# Patient Record
Sex: MALE | Employment: OTHER
[De-identification: names, ages, dates, MRNs, and addresses within clinical notes are randomized per-mention and may not be internally consistent; named-entity substitution may affect disease eponyms.]

---

## 2017-11-14 ENCOUNTER — HOSPITAL (OUTPATIENT)
Dept: OTHER | Age: 77
End: 2017-11-14
Attending: PHYSICAL MEDICINE & REHABILITATION

## 2017-11-15 ENCOUNTER — CHARTING TRANS (OUTPATIENT)
Dept: OTHER | Age: 77
End: 2017-11-15

## 2017-12-12 ENCOUNTER — HOSPITAL (OUTPATIENT)
Dept: OTHER | Age: 77
End: 2017-12-12
Attending: OPHTHALMOLOGY

## 2017-12-12 LAB — GLUCOSE BLDC GLUCOMTR-MCNC: 107 MG/DL (ref 65–99)

## 2018-01-04 ENCOUNTER — HOSPITAL (OUTPATIENT)
Dept: OTHER | Age: 78
End: 2018-01-04
Attending: OPHTHALMOLOGY

## 2018-01-04 LAB — GLUCOSE BLDC GLUCOMTR-MCNC: 101 MG/DL (ref 65–99)

## 2018-05-31 ENCOUNTER — CHARTING TRANS (OUTPATIENT)
Dept: OTHER | Age: 78
End: 2018-05-31

## 2018-05-31 ENCOUNTER — HOSPITAL (OUTPATIENT)
Dept: OTHER | Age: 78
End: 2018-05-31
Attending: PHYSICAL MEDICINE & REHABILITATION

## 2019-01-24 ENCOUNTER — HOSPITAL (OUTPATIENT)
Dept: OTHER | Age: 79
End: 2019-01-24

## 2019-01-24 ENCOUNTER — HOSPITAL (OUTPATIENT)
Dept: OTHER | Age: 79
End: 2019-01-24
Attending: PHYSICAL MEDICINE & REHABILITATION

## 2019-04-18 ENCOUNTER — HOSPITAL (OUTPATIENT)
Dept: OTHER | Age: 79
End: 2019-04-18
Attending: PHYSICAL MEDICINE & REHABILITATION

## 2019-05-01 ENCOUNTER — HOSPITAL (OUTPATIENT)
Dept: OTHER | Age: 79
End: 2019-05-01
Attending: PHYSICAL MEDICINE & REHABILITATION

## 2019-05-02 ENCOUNTER — HOSPITAL (OUTPATIENT)
Dept: OTHER | Age: 79
End: 2019-05-02
Attending: PHYSICAL MEDICINE & REHABILITATION

## 2019-06-01 ENCOUNTER — HOSPITAL (OUTPATIENT)
Dept: OTHER | Age: 79
End: 2019-06-01
Attending: PHYSICAL MEDICINE & REHABILITATION

## 2020-01-23 ENCOUNTER — HOSPITAL ENCOUNTER (OUTPATIENT)
Dept: PAIN MANAGEMENT | Age: 80
Discharge: HOME | End: 2020-01-23
Attending: PHYSICAL MEDICINE & REHABILITATION

## 2020-01-23 ENCOUNTER — HOSPITAL ENCOUNTER (OUTPATIENT)
Dept: GENERAL RADIOLOGY | Age: 80
Discharge: HOME | End: 2020-01-23
Attending: PHYSICAL MEDICINE & REHABILITATION

## 2020-01-23 ENCOUNTER — PREP FOR CASE (OUTPATIENT)
Dept: PHYSICAL MEDICINE AND REHAB | Age: 80
End: 2020-01-23

## 2020-01-23 VITALS
WEIGHT: 195 LBS | DIASTOLIC BLOOD PRESSURE: 82 MMHG | OXYGEN SATURATION: 98 % | BODY MASS INDEX: 27.92 KG/M2 | HEART RATE: 98 BPM | RESPIRATION RATE: 17 BRPM | SYSTOLIC BLOOD PRESSURE: 164 MMHG | HEIGHT: 70 IN

## 2020-01-23 DIAGNOSIS — R52 PAIN: ICD-10-CM

## 2020-01-23 DIAGNOSIS — M48.04 STENOSIS, SPINAL, THORACIC: Primary | ICD-10-CM

## 2020-01-23 PROCEDURE — 13000067 HB PAIN MANAGEMENT GROUP 2

## 2020-01-23 PROCEDURE — 10002800 HB RX 250 W HCPCS: Performed by: PHYSICAL MEDICINE & REHABILITATION

## 2020-01-23 PROCEDURE — 76000 FLUOROSCOPY <1 HR PHYS/QHP: CPT

## 2020-01-23 PROCEDURE — 10002801 HB RX 250 W/O HCPCS: Performed by: PHYSICAL MEDICINE & REHABILITATION

## 2020-01-23 PROCEDURE — 10004651 HB RX, NO CHARGE ITEM: Performed by: PHYSICAL MEDICINE & REHABILITATION

## 2020-01-23 PROCEDURE — 10002805 HB CONTRAST AGENT: Performed by: PHYSICAL MEDICINE & REHABILITATION

## 2020-01-23 RX ORDER — LIDOCAINE HYDROCHLORIDE 10 MG/ML
30 INJECTION, SOLUTION INFILTRATION; PERINEURAL ONCE
Status: CANCELLED | OUTPATIENT
Start: 2020-01-23 | End: 2020-01-23

## 2020-01-23 RX ORDER — HYDROCHLOROTHIAZIDE 25 MG/1
25 TABLET ORAL DAILY
COMMUNITY

## 2020-01-23 RX ORDER — LOSARTAN POTASSIUM 50 MG/1
50 TABLET ORAL DAILY
COMMUNITY

## 2020-01-23 RX ORDER — ASCORBIC ACID 500 MG
500 TABLET ORAL DAILY
COMMUNITY

## 2020-01-23 RX ORDER — LIDOCAINE HYDROCHLORIDE 10 MG/ML
30 INJECTION, SOLUTION INFILTRATION; PERINEURAL ONCE
Status: COMPLETED | OUTPATIENT
Start: 2020-01-23 | End: 2020-01-23

## 2020-01-23 RX ORDER — DEXAMETHASONE SODIUM PHOSPHATE 4 MG/ML
15 INJECTION, SOLUTION INTRA-ARTICULAR; INTRALESIONAL; INTRAMUSCULAR; INTRAVENOUS; SOFT TISSUE ONCE
Status: DISCONTINUED | OUTPATIENT
Start: 2020-01-23 | End: 2020-01-23

## 2020-01-23 RX ORDER — DEXAMETHASONE SODIUM PHOSPHATE 10 MG/ML
15 INJECTION, SOLUTION INTRAMUSCULAR; INTRAVENOUS ONCE
Status: COMPLETED | OUTPATIENT
Start: 2020-01-23 | End: 2020-01-23

## 2020-01-23 RX ORDER — DEXAMETHASONE SODIUM PHOSPHATE 10 MG/ML
15 INJECTION INTRAMUSCULAR; INTRAVENOUS ONCE
Status: CANCELLED | OUTPATIENT
Start: 2020-01-23 | End: 2020-01-23

## 2020-01-23 RX ADMIN — DEXAMETHASONE SODIUM PHOSPHATE 15 MG: 10 INJECTION, SOLUTION INTRAMUSCULAR; INTRAVENOUS at 12:35

## 2020-01-23 RX ADMIN — SODIUM CHLORIDE 10 ML: 9 INJECTION, SOLUTION INTRAMUSCULAR; INTRAVENOUS; SUBCUTANEOUS at 12:36

## 2020-01-23 RX ADMIN — LIDOCAINE HYDROCHLORIDE 300 MG: 10 INJECTION, SOLUTION EPIDURAL; INFILTRATION; INTRACAUDAL; PERINEURAL at 12:32

## 2020-01-23 RX ADMIN — IOHEXOL 10 ML: 300 INJECTION, SOLUTION INTRAVENOUS at 12:33

## 2020-01-23 ASSESSMENT — PAIN SCALES - GENERAL: PAINLEVEL_OUTOF10: 1

## 2020-04-17 ENCOUNTER — TELEPHONE (OUTPATIENT)
Dept: ORTHOPEDIC SURGERY | Age: 80
End: 2020-04-17

## 2020-04-23 ENCOUNTER — TELEPHONE (OUTPATIENT)
Dept: ORTHOPEDIC SURGERY | Age: 80
End: 2020-04-23

## 2020-04-23 ENCOUNTER — VIRTUAL VISIT (OUTPATIENT)
Dept: ORTHOPEDIC SURGERY | Age: 80
End: 2020-04-23
Payer: MEDICARE

## 2020-04-23 VITALS — BODY MASS INDEX: 27.2 KG/M2 | HEIGHT: 70 IN | WEIGHT: 190 LBS | RESPIRATION RATE: 17 BRPM

## 2020-04-23 PROBLEM — M72.0 DUPUYTREN'S CONTRACTURE OF BOTH HANDS: Status: ACTIVE | Noted: 2020-04-23

## 2020-04-23 PROCEDURE — 4040F PNEUMOC VAC/ADMIN/RCVD: CPT | Performed by: ORTHOPAEDIC SURGERY

## 2020-04-23 PROCEDURE — 1123F ACP DISCUSS/DSCN MKR DOCD: CPT | Performed by: ORTHOPAEDIC SURGERY

## 2020-04-23 PROCEDURE — 99203 OFFICE O/P NEW LOW 30 MIN: CPT | Performed by: ORTHOPAEDIC SURGERY

## 2020-04-23 PROCEDURE — G8427 DOCREV CUR MEDS BY ELIG CLIN: HCPCS | Performed by: ORTHOPAEDIC SURGERY

## 2020-04-23 RX ORDER — HYDROCHLOROTHIAZIDE 25 MG/1
TABLET ORAL
COMMUNITY
Start: 2020-03-04

## 2020-04-23 RX ORDER — ZINC ACETATE 50 MG/1
1 CAPSULE ORAL
COMMUNITY
Start: 2020-03-04

## 2020-04-23 RX ORDER — LOSARTAN POTASSIUM 50 MG/1
TABLET ORAL
COMMUNITY
Start: 2020-02-27

## 2020-04-23 RX ORDER — ASPIRIN 81 MG/1
TABLET, CHEWABLE ORAL
COMMUNITY
Start: 2020-03-04

## 2020-04-23 RX ORDER — SIMVASTATIN 20 MG
TABLET ORAL
COMMUNITY
Start: 2020-02-24

## 2020-06-29 ENCOUNTER — OFFICE VISIT (OUTPATIENT)
Dept: ORTHOPEDIC SURGERY | Age: 80
End: 2020-06-29
Payer: MEDICARE

## 2020-06-29 PROCEDURE — 20527 NJX NZM PALMAR FASCIAL CORD: CPT | Performed by: ORTHOPAEDIC SURGERY

## 2020-06-29 NOTE — PROGRESS NOTES
todays injection. I have asked him to followup with me in the office at the prescribed time. He is also specifically requested to call or return to the office sooner if his symptoms change or worsen prior to the next scheduled appointment.

## 2020-07-01 RX ORDER — LIDOCAINE HYDROCHLORIDE 10 MG/ML
10 INJECTION, SOLUTION INFILTRATION; PERINEURAL ONCE
Status: CANCELLED | OUTPATIENT
Start: 2020-07-01 | End: 2020-07-01

## 2020-07-02 ENCOUNTER — HOSPITAL ENCOUNTER (OUTPATIENT)
Dept: OCCUPATIONAL THERAPY | Age: 80
Setting detail: THERAPIES SERIES
Discharge: HOME | End: 2020-07-02
Payer: MEDICARE

## 2020-07-02 ENCOUNTER — OFFICE VISIT (OUTPATIENT)
Dept: ORTHOPEDIC SURGERY | Age: 80
End: 2020-07-02
Payer: MEDICARE

## 2020-07-02 VITALS — BODY MASS INDEX: 27.2 KG/M2 | WEIGHT: 190 LBS | HEIGHT: 70 IN

## 2020-07-02 PROCEDURE — 1036F TOBACCO NON-USER: CPT | Performed by: ORTHOPAEDIC SURGERY

## 2020-07-02 PROCEDURE — 4040F PNEUMOC VAC/ADMIN/RCVD: CPT | Performed by: ORTHOPAEDIC SURGERY

## 2020-07-02 PROCEDURE — G8417 CALC BMI ABV UP PARAM F/U: HCPCS | Performed by: ORTHOPAEDIC SURGERY

## 2020-07-02 PROCEDURE — G8427 DOCREV CUR MEDS BY ELIG CLIN: HCPCS | Performed by: ORTHOPAEDIC SURGERY

## 2020-07-02 PROCEDURE — 99213 OFFICE O/P EST LOW 20 MIN: CPT | Performed by: ORTHOPAEDIC SURGERY

## 2020-07-02 PROCEDURE — L3913 HFO W/O JOINTS CF: HCPCS | Performed by: OCCUPATIONAL THERAPIST

## 2020-07-02 PROCEDURE — 1123F ACP DISCUSS/DSCN MKR DOCD: CPT | Performed by: ORTHOPAEDIC SURGERY

## 2020-07-02 RX ORDER — LIDOCAINE HYDROCHLORIDE 10 MG/ML
10 INJECTION, SOLUTION INFILTRATION; PERINEURAL ONCE
Status: CANCELLED | OUTPATIENT
Start: 2020-07-02 | End: 2020-07-02

## 2020-07-02 NOTE — PLAN OF CARE
[facility],  [address]  Phone: [phone]  Fax [phone]    Patient: Reese Celeste   : [birth year]  MRN: [id number]  Referring Physician: Referring Practitioner: Mary Wong MD    Evaluation Date: 2020      Medical Diagnosis Information:  Diagnosis: M24.542 Left hand dupuytren's  Xiaflex injection on 2020, cord manipulation on 2020         Occupational Therapy Splint Certification Form  Dear Referring Practitioner: Mary Wong MD ,  The following patient has been evaluated for occupational therapy services for fabrication of a custom orthosis. Insurance requires the referring physician to review the treatment plan. Please review the attached evaluation and/or summary of the patient's plan of care, and verify that you agree by signing the attached document and sending it back to our office. Plan of Care/Treatment to date:  [x] Fabrication of custom orthosis- ( hand finger orthosis ring and small fingers in extension)  [] Instruction on splint use, care and wearing schedule      [] Follow up as needed for splint modifications          Frequency/Duration:  [] One time visit for splint fabrication and instructions. Follow up as needed for splint modifcations      [] Splint fabricated, patient to return for full evaluation.     Rehab Potential: [x] good [] fair  [] poor     SPLINT EVALUATION    Date: 2020  Name: Reese Celeste            : 1940      Medical/Treatment Diagnosis Information:  · Diagnosis: M24.542 Left hand dupuytren's  Xiaflex injection on 2020, cord manipulation on 8169  Insurance/Certification information:  OT Insurance Information: Medicare  Physician Information:  Referring Practitioner: Mary Wong MD       Next MD Appointment: 2 weeks    Subjective  History of Injury/ Mechanism of Injury: at least a 5 yr history of small finger drawing down into palm. Had a Xiaflex injection on Monday, was reaching down to  a towel yesterday and had the cord rupture on it's own. MD did not need to manipulate his hand today, came over for orthosis fabrication. Onset/Surgery Date: injection 6/29/2020  Dominant Hand:    [x] Right []Left  Occupational/Vocational Status: retired, avid cyclist  Progress of any previous OT/PT: the patient []has/ [x]has not received OT/PT previously for this diagnosis. Pain: 0/10  Objective Findings as appropriate:  ROM, strength, edema, wound/ scar appearance, function:    Type of splint:  ( hand finger orthosis ring and small fingers in extension)  Splint protocol utilization:   Night time use  Splint Purpose: []Immobilize or protect []Promote healing of    []Relieve pain  []Provide support for improved hand function [x]Maximize joint motion    Treatment:   [x]Splint provided ([x]Customized/ []Prefabricated), and splint rationale explained. [x]Patient instructed in []wear/ []care of splint and educated regarding diagnosis. []Patient instructed in symptom reduction techniques   []HEP instruction    []Discussed ADL assistive device    Written Information Distributed: []HEP  [x]Splint care and wearing protocol    Patient response to evaluation and instructions:  [x]Attentive/interested   [x]Asked questions/ retained info  []Appeared disinterested  []Poor retention of information  []Appeared anxious/ fearful    Assessment and Plan:  Goals: [x]Patient will be able to verbalize rationale for, and demonstrate proper wearing     of splint. [x]Splint will provide proper fit and function. []Patient will be able to verbalize 2-3 ways to prevent further symptoms. []Patient will be able to don and doff independently. []Patient will be independent with HEP    Goals met:  [x]yes []no    Plan:  [x]Splint completed with good fit and function.   Hand Therapy to follow up for     splint modifications as needed    []Splint completed; OT/PT evaluation initiated. Patient to return for further     treatment.     Diego Rajan, OTR/L, [facility]

## 2020-07-02 NOTE — PROGRESS NOTES
Chief Complaint:  Follow-up ([facility] [place] MANIPULATION LEFT SMALL FINGER)      History of Present of Illness: The patient returns today for a scheduled manipulation after Xiaflex injection earlier this week. He was lifting a towel and felt a very dramatic pop in his finger and then had complete extension of the digit. Review of Systems  Pertinent items are noted in HPI  Denies fever, chills, confusion, bowel/bladder active change. Review of systems reviewed from Patient History Form dated on 7/1/2020 and available in the patient's chart under the Media tab. Examination:  On exam today he does have some bruising and some swelling but he now is able to demonstrate full MP extension at the small finger. He demonstrates good integrity of the digital flexors and there is good perfusion and sensation. There is no skin splitting. Radiology:     X-rays obtained and reviewed in office:  Views    Location    Impression      No orders of the defined types were placed in this encounter. Impression:  Encounter Diagnosis   Name Primary?  Dupuytren's contracture of left hand Yes         Treatment Plan:  I am pleased with his improved range of motion and I do not see a reason for an additional manipulation by me in the office today. I have recommended that he proceed with the therapy appointment for the low-tension extension brace, and we will see him back in about 2 weeks. Please note that this transcription was created using voice recognition software. Any errors are unintentional and may be due to voice recognition transcription.

## 2020-07-20 ENCOUNTER — OFFICE VISIT (OUTPATIENT)
Dept: ORTHOPEDIC SURGERY | Age: 80
End: 2020-07-20
Payer: MEDICARE

## 2020-07-20 VITALS — HEIGHT: 70 IN | RESPIRATION RATE: 17 BRPM | BODY MASS INDEX: 27.2 KG/M2 | WEIGHT: 190 LBS

## 2020-07-20 PROBLEM — M25.522 LEFT ELBOW PAIN: Status: ACTIVE | Noted: 2020-07-20

## 2020-07-20 PROCEDURE — 1036F TOBACCO NON-USER: CPT | Performed by: ORTHOPAEDIC SURGERY

## 2020-07-20 PROCEDURE — 99213 OFFICE O/P EST LOW 20 MIN: CPT | Performed by: ORTHOPAEDIC SURGERY

## 2020-07-20 PROCEDURE — G8417 CALC BMI ABV UP PARAM F/U: HCPCS | Performed by: ORTHOPAEDIC SURGERY

## 2020-07-20 PROCEDURE — 1123F ACP DISCUSS/DSCN MKR DOCD: CPT | Performed by: ORTHOPAEDIC SURGERY

## 2020-07-20 PROCEDURE — E0191 PROTECTOR HEEL OR ELBOW: HCPCS | Performed by: ORTHOPAEDIC SURGERY

## 2020-07-20 PROCEDURE — 4040F PNEUMOC VAC/ADMIN/RCVD: CPT | Performed by: ORTHOPAEDIC SURGERY

## 2020-07-20 PROCEDURE — G8427 DOCREV CUR MEDS BY ELIG CLIN: HCPCS | Performed by: ORTHOPAEDIC SURGERY

## 2020-07-20 NOTE — PROGRESS NOTES
Chief Complaint:  Hand Pain (CK LT HAND DUPUYTRENS)      History of Present of Illness: The patient returns and has been very pleased with the improvement after his collagenase injection and subsequent manipulation. He also shares with me that sometimes he will have some sharp pain over the back of his left elbow after activities and he is also looking to engage with a spine specialist for some ongoing spinal stenosis in his back. Review of Systems  Pertinent items are noted in HPI  Denies fever, chills, confusion, bowel/bladder active change. Review of systems reviewed from Patient History Form dated on 7/1/2020 and available in the patient's chart under the Media tab. Examination:  On exam today he now places his hand flat on the tabletop without any significant flexion contracture. Skin remains intact with good perfusion and sensation and full range of motion. He does have some nodularity over the MP crease but it is not particularly tender. Over the left posterior elbow there is some very mild fullness consistent with his olecranon bursa and perhaps some mild tenderness over the cutaneous nerves in the olecranon tip area. There is no particular tenderness over the ulnar nerve in the cubital tunnel or over the epicondyles or the common extensor origin. Radiology:     X-rays obtained and reviewed in office:  Views    Location    Impression      Orders Placed This Encounter   Procedures   Marino Andujar MD, Physical Medicine and Rehabilitation, [facility]     Referral Priority:   Routine     Referral Type:   Eval and Treat     Referral Reason:   Specialty Services Required     Referred to Provider:   Marly Jolley MD     Requested Specialty:   Physical Medicine and Rehab     Number of Visits Requested:   1    Bird and Sandi Heel and Elbow Protector     Patient was prescribed a Bird and Sandi Elbow Protector.   The left elbow will require protection from this device to improve their function. The orthosis will assist in protecting the affected area, provide functional support and facilitate healing. The patient was educated and fit by a healthcare professional with expert knowledge and specialization in brace application while under the direct supervision of the treating physician. Verbal and written instructions for the use of and application of this item were provided. They were instructed to contact the office immediately should the brace result in increased pain, decreased sensation, increased swelling or worsening of the condition. Impression:  Encounter Diagnoses   Name Primary?  Left elbow pain Yes    Back pain, unspecified back location, unspecified back pain laterality, unspecified chronicity     Dupuytren's contracture of both hands          Treatment Plan:  I discussed with him that he has had an excellent result at this juncture from his collagenase injection and manipulation. I also believe he likely has some occasional olecranon bursitis with cutaneous nerve irritation. We will provide him with a Heelbo elbow pad for instances when his elbow flares up. Finally, as he is relocated to [place] from [facility], I will give him a referral to Dr. Zander Hector who is our trusted spine expert at this practice. We will help him make that appointment to get engaged with that practice. I will see him back on an as-needed basis moving forward as he knows that recurrences may be noted down the road with Dupuytren's. Please note that this transcription was created using voice recognition software. Any errors are unintentional and may be due to voice recognition transcription.

## 2020-08-21 ENCOUNTER — OFFICE VISIT (OUTPATIENT)
Dept: ORTHOPEDIC SURGERY | Age: 80
End: 2020-08-21
Payer: MEDICARE

## 2020-08-21 VITALS — HEIGHT: 70 IN | WEIGHT: 190 LBS | BODY MASS INDEX: 27.2 KG/M2

## 2020-08-21 PROCEDURE — G8427 DOCREV CUR MEDS BY ELIG CLIN: HCPCS | Performed by: PHYSICAL MEDICINE & REHABILITATION

## 2020-08-21 PROCEDURE — 4040F PNEUMOC VAC/ADMIN/RCVD: CPT | Performed by: PHYSICAL MEDICINE & REHABILITATION

## 2020-08-21 PROCEDURE — 1036F TOBACCO NON-USER: CPT | Performed by: PHYSICAL MEDICINE & REHABILITATION

## 2020-08-21 PROCEDURE — G8417 CALC BMI ABV UP PARAM F/U: HCPCS | Performed by: PHYSICAL MEDICINE & REHABILITATION

## 2020-08-21 PROCEDURE — 99204 OFFICE O/P NEW MOD 45 MIN: CPT | Performed by: PHYSICAL MEDICINE & REHABILITATION

## 2020-08-21 PROCEDURE — 1123F ACP DISCUSS/DSCN MKR DOCD: CPT | Performed by: PHYSICAL MEDICINE & REHABILITATION

## 2020-08-21 NOTE — PROGRESS NOTES
New Patient: SPINE    CHIEF COMPLAINT:    Chief Complaint   Patient presents with    Back Pain     pain in between shoulder blades xyears, has had CARMELO in past, recently moved from [facility], ref from Dr Bing Sánchez:                The patient is a [de-identified] y.o. male seen in consultation by Dr. Sonia Fernández for back pain the patient is moved recently from [place]. He is establishing spine care here    He has a greater than 20-year history of chronic intermittent thoracic pain and low back pain. This is previously been managed with therapy medications and intermittent epidural injections as well as past radiofrequency. He has chronic mild discomfort every day. He has intermittent flareups. His last \"steroid injection\" was January 2020. He gets 2 to 3 injections a year which have been beneficial.  He reports pain worse with laying. He rates his pain 6/10 symptoms are worsening. He reports no axial neck pain and no radiating pain down his arms but has posterior scapular pain    No past medical history on file. Pain Assessment  Location of Pain: Back  Severity of Pain: 4  Quality of Pain: Aching  Duration of Pain: Persistent  Frequency of Pain: Intermittent  Aggravating Factors: Other (Comment)  Limiting Behavior: No  Relieving Factors: Rest  Result of Injury: No  Work-Related Injury: No  Are there other pain locations you wish to document?: No    The pain assessment was noted & reviewed in the medical record today.      Current/Past Treatment:   · Physical Therapy: Past  · Chiropractic:     · Injection:   RFA in 2 years ago and last presumed epidural injection January 2020 at [facility] in [address] [place] [address] by Dr. Martina Ramos  Medications:            NSAIDS: Past            Muscle relaxer:              Steriods:              Neuropathic medications:              Opioids: Past, last several years ago            Other: Current APAP  · Surgery/Consult:    Work Status/Functionality: Retired and moved to [facility] from [place]    Past Medical History: Medical history form was reviewed today & scanned into the media tab  No past medical history on file. Past Surgical History:     No past surgical history on file. Current Medications:     Current Outpatient Medications:     aspirin (ST DORI LOW DOSE) 81 MG chewable tablet, Take by mouth, Disp: , Rfl:     hydroCHLOROthiazide (HYDRODIURIL) 25 MG tablet, Take by mouth, Disp: , Rfl:     losartan (COZAAR) 50 MG tablet, TAKE 1 TABLET BY MOUTH ONCE DAILY . ... [employer] OFFICE VISIT BEFORE NEXT REFILL. ..., Disp: , Rfl:     simvastatin (ZOCOR) 20 MG tablet, TAKE 1 TABLET BY MOUTH ONCE DAILY AT BEDTIME . [employer] DUE FOR LABS SOON . ..., Disp: , Rfl:     Zinc Acetate (GALZIN) 50 MG capsule, Take 1 capsule by mouth, Disp: , Rfl:   Allergies:  Propoxyphene  Social History:    reports that he has never smoked. He has never used smokeless tobacco.  Family History:   No family history on file. REVIEW OF SYSTEMS: Full ROS noted & scanned   CONSTITUTIONAL: Denies unexplained weight loss, fevers, chills or fatigue  NEUROLOGICAL: Denies unsteady gait or progressive weakness  MUSCULOSKELETAL: Denies joint swelling or redness  PSYCHOLOGICAL: Denies anxiety, depression   SKIN: Denies skin changes, delayed healing, rash, itching   HEMATOLOGIC: Denies easy bleeding or bruising  ENDOCRINE: Denies excessive thirst, urination, heat/cold  RESPIRATORY: Denies current dyspnea, cough  GI: Denies nausea, vomiting, diarrhea   : Denies bowel or bladder issues       PHYSICAL EXAM:    Vitals: Height 5' 10\" (1.778 m), weight 190 lb (86.2 kg). GENERAL EXAM:  · General Apparence: Patient is adequately groomed with no evidence of malnutrition. · Orientation: The patient is oriented to time, place and person. · Mood & Affect:The patient's mood and affect are appropriate   · Vascular: Examination reveals no swelling tenderness in upper or lower extremities.  Good capillary refill  · Lymphatic: The lymphatic examination bilaterally reveals all areas to be without enlargement or induration  · Sensation: Sensation is intact without deficit  · Coordination/Balance: Good coordination     CERVICAL EXAMINATION:  · Inspection: Local inspection shows no step-off or bruising. Cervical alignment is normal.     · Palpation: No evidence of tenderness at the midline, and trapezius. Paraspinal tenderness is present. There is no step-off or paraspinal spasm. · Range of Motion: Minimal loss of flexion extension  · Strength: 5/5 bilateral upper extremities   · Special Tests:    ·   Spurling's, L'Hermitte's & Hamm's negative bilaterally. ·   López and Impingement tests are negative bilaterally. ·  Cubital and Carpal tunnel Tinel's negative bilaterally. · Skin:There are no rashes, ulcerations or lesions in right & left upper extremities. · Reflexes: Bilaterally triceps, biceps and brachioradialis are 2+. Clonus absent bilaterally at the feet. · Additional Examinations:       · RIGHT UPPER EXTREMITY:  Inspection/examination of the right upper extremity does not show any tenderness, deformity or injury. Range of motion is full. There is no gross instability. There are no rashes, ulcerations or lesions. Strength and tone are normal.  · LEFT UPPER EXTREMITY: Inspection/examination of the left upper extremity does not show any tenderness, deformity or injury. Range of motion is full. There is no gross instability. There are no rashes, ulcerations or lesions. Strength and tone are normal.    LUMBAR/SACRAL EXAMINATION:  · Inspection: Local inspection shows no step-off or bruising. Lumbar alignment is normal.  Sagittal and Coronal balance is neutral.      · Palpation:   No evidence of tenderness at the midline. No tenderness bilaterally at the paraspinal or trochanters. There is no step-off or paraspinal spasm.    · Range of Motion: No severe pain with flexion or extension  · Strength:   Strength testing is 5/5 in all muscle groups tested. · Special Tests:   Straight leg raise and crossed SLR negative. Leg length and pelvis level.  0 out of 5 Melissa's signs. · Skin: There are no rashes, ulcerations or lesions. · Reflexes: Reflexes are symmetrically 2+ at the patellar and ankle tendons. Clonus absent bilaterally at the feet. · Gait & station: Normal gait  · Additional Examinations:   · RIGHT LOWER EXTREMITY: Inspection/examination of the right lower extremity does not show any tenderness, deformity or injury. Range of motion is full. There is no gross instability. There are no rashes, ulcerations or lesions. Strength and tone are normal.  ·   · LEFT LOWER EXTREMITY:  Inspection/examination of the left lower extremity does not show any tenderness, deformity or injury. Range of motion is full. There is no gross instability. There are no rashes, ulcerations or lesions. Strength and tone are normal.    Diagnostic Testing:      Cervical x-ray x-rays 2 views AP and lateral 8/21/2020 show moderate discogenic spondylosis    Thoracic x-rays 2 views AP and lateral 8/21/2020 show moderate discogenic spondylosis without acute fracture    Impression:    Chronic thoracic pain, thoracic spondylosis  Chronic back pain        Plan:     Suggest updated MRIs of his thoracic and lumbar spine prior to pursuing injection; f/u after. Despite his axial only pain he feels he has had adequate relief and functional improvements with previous epidurals.   This something we can consider after review    Lastly, will try to obtain old records from [facility]    Tylenol 1 g 3 times daily    F Enriquez Jorge

## 2020-10-23 ENCOUNTER — HOSPITAL ENCOUNTER (OUTPATIENT)
Dept: MRI IMAGING | Age: 80
Discharge: HOME | End: 2020-10-23
Payer: MEDICARE

## 2020-10-23 PROCEDURE — 72146 MRI CHEST SPINE W/O DYE: CPT

## 2020-10-23 PROCEDURE — 72148 MRI LUMBAR SPINE W/O DYE: CPT

## 2020-10-29 ENCOUNTER — OFFICE VISIT (OUTPATIENT)
Dept: ORTHOPEDIC SURGERY | Age: 80
End: 2020-10-29
Payer: MEDICARE

## 2020-10-29 VITALS — HEIGHT: 70 IN | WEIGHT: 190 LBS | BODY MASS INDEX: 27.2 KG/M2

## 2020-10-29 PROCEDURE — 1123F ACP DISCUSS/DSCN MKR DOCD: CPT | Performed by: PHYSICAL MEDICINE & REHABILITATION

## 2020-10-29 PROCEDURE — G8484 FLU IMMUNIZE NO ADMIN: HCPCS | Performed by: PHYSICAL MEDICINE & REHABILITATION

## 2020-10-29 PROCEDURE — 1036F TOBACCO NON-USER: CPT | Performed by: PHYSICAL MEDICINE & REHABILITATION

## 2020-10-29 PROCEDURE — G8427 DOCREV CUR MEDS BY ELIG CLIN: HCPCS | Performed by: PHYSICAL MEDICINE & REHABILITATION

## 2020-10-29 PROCEDURE — G8417 CALC BMI ABV UP PARAM F/U: HCPCS | Performed by: PHYSICAL MEDICINE & REHABILITATION

## 2020-10-29 PROCEDURE — 99214 OFFICE O/P EST MOD 30 MIN: CPT | Performed by: PHYSICAL MEDICINE & REHABILITATION

## 2020-10-29 PROCEDURE — 4040F PNEUMOC VAC/ADMIN/RCVD: CPT | Performed by: PHYSICAL MEDICINE & REHABILITATION

## 2020-10-29 NOTE — PROGRESS NOTES
Lumbar thoracic follow-up: SPINE    CHIEF COMPLAINT:    Chief Complaint   Patient presents with    Back Pain     MRI result lumbar & thoracic spine       HISTORY OF PRESENT ILLNESS:                The patient is a [de-identified] y.o. male initially seen in consultation by Dr. Aislinn Cosby for back pain the patient is moved recently from [place]. He is establishing spine care here    Follows up today for thoracic and lumbar MRIs. Still stiffness and intermittent back pain and thoracic pain. He has had intermittent injections which provide significant relief. He believes her steroid injections done in [place]. We attempted to get records but have not been successful. He has no rating pain or weakness    He initially reported  a greater than 20-year history of chronic intermittent thoracic pain and low back pain. This is previously been managed with therapy medications and intermittent epidural injections as well as past radiofrequency. He has chronic mild discomfort every day. He has intermittent flareups. His last \"steroid injection\" was January 2020. He gets 2 to 3 injections a year which have been beneficial.  He reports pain worse with laying. He rates his pain 6/10 symptoms are worsening. He reports no axial neck pain and no radiating pain down his arms but has posterior scapular pain    No past medical history on file. Pain Assessment  Location of Pain: Back  Severity of Pain: 3  Quality of Pain: Aching  Duration of Pain: Persistent  Frequency of Pain: Constant  Aggravating Factors: Standing  Limiting Behavior: Yes  Relieving Factors: Rest  Result of Injury: No  Work-Related Injury: No  Are there other pain locations you wish to document?: No    The pain assessment was noted & reviewed in the medical record today.      Current/Past Treatment:   · Physical Therapy: Past  · Chiropractic:     · Injection:   RFA in 2 years ago and last presumed epidural injection January 2020 at [facility] bone and joint in [address] [facility] by Dr. Jannette Rodriguez  Medications:            NSAIDS: Past            Muscle relaxer:              Steriods:              Neuropathic medications:              Opioids: Past, last several years ago            Other: Current APAP  · Surgery/Consult:    Work Status/Functionality: Retired and moved to [facility] ([facility]) from [place]    Past Medical History: Medical history form was reviewed today & scanned into the media tab  No past medical history on file. Past Surgical History:     No past surgical history on file. Current Medications:     Current Outpatient Medications:     aspirin (ST DORI LOW DOSE) 81 MG chewable tablet, Take by mouth, Disp: , Rfl:     hydroCHLOROthiazide (HYDRODIURIL) 25 MG tablet, Take by mouth, Disp: , Rfl:     losartan (COZAAR) 50 MG tablet, TAKE 1 TABLET BY MOUTH ONCE DAILY . ... iNno Araiza OFFICE VISIT BEFORE NEXT REFILL. ..., Disp: , Rfl:     simvastatin (ZOCOR) 20 MG tablet, TAKE 1 TABLET BY MOUTH ONCE DAILY AT BEDTIME . Nino Araiza DUE FOR LABS SOON . ..., Disp: , Rfl:     Zinc Acetate (GALZIN) 50 MG capsule, Take 1 capsule by mouth, Disp: , Rfl:   Allergies:  Propoxyphene  Social History:    reports that he has never smoked. He has never used smokeless tobacco.  Family History:   No family history on file. REVIEW OF SYSTEMS: Full ROS noted & scanned   CONSTITUTIONAL: Denies unexplained weight loss, fevers, chills or fatigue  NEUROLOGICAL: Denies unsteady gait or progressive weakness  MUSCULOSKELETAL: Denies joint swelling or redness  PSYCHOLOGICAL: Denies anxiety, depression   SKIN: Denies skin changes, delayed healing, rash, itching   HEMATOLOGIC: Denies easy bleeding or bruising  ENDOCRINE: Denies excessive thirst, urination, heat/cold  RESPIRATORY: Denies current dyspnea, cough  GI: Denies nausea, vomiting, diarrhea   : Denies bowel or bladder issues       PHYSICAL EXAM:    Vitals: Height 5' 10\" (1.778 m), weight 190 lb (86.2 kg).     GENERAL EXAM:  · General Apparence: Patient is adequately groomed with no evidence of malnutrition. · Orientation: The patient is oriented to time, place and person. · Mood & Affect:The patient's mood and affect are appropriate   · Vascular: Examination reveals no swelling tenderness in upper or lower extremities. Good capillary refill  · Lymphatic: The lymphatic examination bilaterally reveals all areas to be without enlargement or induration  · Sensation: Sensation is intact without deficit  · Coordination/Balance: Good coordination     CERVICAL EXAMINATION:  · Inspection: Local inspection shows no step-off or bruising. Cervical alignment is normal.     · Palpation: No evidence of tenderness at the midline, and trapezius. Paraspinal tenderness is present. There is no step-off or paraspinal spasm. · Range of Motion: Minimal loss of flexion extension  · Strength: 5/5 bilateral upper extremities   · Special Tests:    ·   Spurling's, L'Hermitte's & Hamm's negative bilaterally. ·   López and Impingement tests are negative bilaterally. ·  Cubital and Carpal tunnel Tinel's negative bilaterally. · Skin:There are no rashes, ulcerations or lesions in right & left upper extremities. · Reflexes: Bilaterally triceps, biceps and brachioradialis are 2+. Clonus absent bilaterally at the feet. · Additional Examinations:       · RIGHT UPPER EXTREMITY:  Inspection/examination of the right upper extremity does not show any tenderness, deformity or injury. Range of motion is full. There is no gross instability. There are no rashes, ulcerations or lesions. Strength and tone are normal.  · LEFT UPPER EXTREMITY: Inspection/examination of the left upper extremity does not show any tenderness, deformity or injury. Range of motion is full. There is no gross instability. There are no rashes, ulcerations or lesions. Strength and tone are normal.    LUMBAR/SACRAL EXAMINATION:  · Inspection: Local inspection shows no step-off or bruising.   Lumbar alignment is normal.  Sagittal and Coronal balance is neutral.      · Palpation:   No evidence of tenderness at the midline. No tenderness bilaterally at the paraspinal or trochanters. There is no step-off or paraspinal spasm. · Range of Motion: No severe pain with flexion or extension  · Strength:   Strength testing is 5/5 in all muscle groups tested. · Special Tests:   Straight leg raise and crossed SLR negative. Leg length and pelvis level.  0 out of 5 Melissa's signs. · Skin: There are no rashes, ulcerations or lesions. · Reflexes: Reflexes are symmetrically 2+ at the patellar and ankle tendons. Clonus absent bilaterally at the feet. · Gait & station: Normal gait  · Additional Examinations:   · RIGHT LOWER EXTREMITY: Inspection/examination of the right lower extremity does not show any tenderness, deformity or injury. Range of motion is full. There is no gross instability. There are no rashes, ulcerations or lesions. Strength and tone are normal.  ·   · LEFT LOWER EXTREMITY:  Inspection/examination of the left lower extremity does not show any tenderness, deformity or injury. Range of motion is full. There is no gross instability. There are no rashes, ulcerations or lesions. Strength and tone are normal.    Diagnostic Testing:      Cervical x-ray x-rays 2 views AP and lateral 8/21/2020 show moderate discogenic spondylosis    Thoracic x-rays 2 views AP and lateral 8/21/2020 show moderate discogenic spondylosis without acute fracture    2020 lumbar MRI films and report reviewed:    1. Degenerative changes of the lumbar spine predominately contributing to    right-sided neural foraminal narrowing at L3-L4 and L4-L5. 2. No significant spinal canal stenosis. 3. No spondylolisthesis. 2020 thoracic MRI: Films and reports reviewed    1. No acute abnormality is seen of the unenhanced thoracic spine.     2. There is prominence of the dorsal epidural fat spanning the T4 through T10    levels contributing to minimal narrowing of the thecal sac. 3. No further spinal canal stenosis of the thoracic spine is seen. 4. No significant neural foraminal narrowing.           Impression: Clinical course unchanged    Chronic thoracic pain, thoracic spondylosis  Chronic back pain        Plan:     Midline L5-S1 interlaminar epidural if he gets lasting relief we can repeat this periodically    He does not will consider repeat lumbar neurotomy    I do not think there interventional injection for his thoracic spine    ROMEO Johns

## 2020-10-30 ENCOUNTER — TELEPHONE (OUTPATIENT)
Dept: ORTHOPEDIC SURGERY | Age: 80
End: 2020-10-30

## 2020-10-30 NOTE — TELEPHONE ENCOUNTER
Auth: # NPR    Date: 11/03/2020  Type of SX:  OP  Location: [facility]  CPT: 88909   DX Code: G91.402  SX area: Midline  L5 - S1 Interlaminar CARMELO  Insurance: Medicare

## 2020-11-02 NOTE — H&P
HISTORY AND PHYSICAL/PRE-SEDATION ASSESSMENT    Patient:  Emil Pierre   :  [id number]  Medical Record No.:  [id number]   Date: 11/3/2020    Physician:  Anaid Miller M.D. Facility: [facility]     Nursing History and Physical reviewed and agreed upon. Additional findings:     Allergies:  Propoxyphene    Home Medications:    Prior to Admission medications    Medication Sig Start Date End Date Taking? Authorizing Provider   aspirin (ST DORI LOW DOSE) 81 MG chewable tablet Take by mouth 3/4/20   Historical Provider, MD   hydroCHLOROthiazide (HYDRODIURIL) 25 MG tablet Take by mouth 3/4/20   Historical Provider, MD   losartan (COZAAR) 50 MG tablet TAKE 1 TABLET BY MOUTH ONCE DAILY . ... Nighat Boyer OFFICE VISIT BEFORE NEXT REFILL. ... 20   Historical Provider, MD   simvastatin (ZOCOR) 20 MG tablet TAKE 1 TABLET BY MOUTH ONCE DAILY AT BEDTIME . Nighat Boyer DUE FOR LABS SOON . ... 20   Historical Provider, MD   Zinc Acetate (GALZIN) 50 MG capsule Take 1 capsule by mouth 3/4/20   Historical Provider, MD       Vitals: Stable     PHYSICAL EXAM:  HENT: Airway patent and reviewed  Cardiovascular: Normal rate, regular rhythm, normal heart sounds. Pulmonary/Chest: No wheezes. No rhonchi. No rales. Abdominal: Soft. Bowel sounds are normal. No distension. Mallampati: 2      MALLAMPATI:           []   I. Complete visualization of the soft palate           [x]   II. Complete visualization of the uvula            []   III. Visualization of only the base of the uvula           []   IV. Soft palate is not visible     ASA CLASS:         []   I. Normal, healthy adult           [x]   II.  Mild systemic disease            []   III. Severe systemic disease      Sedation plan:   [x]  Local              []  Minimal                  []  General anesthesia    Patient's condition acceptable for planned procedure/sedation.    Post Procedure Plan   Return to same level of care   ______________________     The risks and benefits as well as alternatives to the procedure have been discussed with the patient and or family. The patient and or next of kin understands and agrees to proceed.     Liv Reddy M.D.

## 2020-11-03 ENCOUNTER — HOSPITAL ENCOUNTER (OUTPATIENT)
Age: 80
Setting detail: OUTPATIENT SURGERY
Discharge: HOME | End: 2020-11-03
Attending: PHYSICAL MEDICINE & REHABILITATION | Admitting: PHYSICAL MEDICINE & REHABILITATION
Payer: MEDICARE

## 2020-11-03 VITALS
HEIGHT: 70 IN | DIASTOLIC BLOOD PRESSURE: 92 MMHG | HEART RATE: 64 BPM | WEIGHT: 190 LBS | BODY MASS INDEX: 27.2 KG/M2 | SYSTOLIC BLOOD PRESSURE: 161 MMHG | OXYGEN SATURATION: 94 % | RESPIRATION RATE: 16 BRPM | TEMPERATURE: 97 F

## 2020-11-03 PROCEDURE — 2709999900 HC NON-CHARGEABLE SUPPLY: Performed by: PHYSICAL MEDICINE & REHABILITATION

## 2020-11-03 PROCEDURE — 2500000003 HC RX 250 WO HCPCS: Performed by: PHYSICAL MEDICINE & REHABILITATION

## 2020-11-03 PROCEDURE — 6360000004 HC RX CONTRAST MEDICATION: Performed by: PHYSICAL MEDICINE & REHABILITATION

## 2020-11-03 PROCEDURE — 3600000002 HC SURGERY LEVEL 2 BASE: Performed by: PHYSICAL MEDICINE & REHABILITATION

## 2020-11-03 PROCEDURE — 6360000002 HC RX W HCPCS: Performed by: PHYSICAL MEDICINE & REHABILITATION

## 2020-11-03 PROCEDURE — 7100000010 HC PHASE II RECOVERY - FIRST 15 MIN: Performed by: PHYSICAL MEDICINE & REHABILITATION

## 2020-11-03 RX ORDER — ACETAMINOPHEN 500 MG
1000 TABLET ORAL EVERY 6 HOURS PRN
COMMUNITY

## 2020-11-03 RX ORDER — ASCORBIC ACID 500 MG
500 TABLET ORAL DAILY
COMMUNITY

## 2020-11-03 RX ORDER — LIDOCAINE HYDROCHLORIDE 10 MG/ML
INJECTION, SOLUTION EPIDURAL; INFILTRATION; INTRACAUDAL; PERINEURAL PRN
Status: DISCONTINUED | OUTPATIENT
Start: 2020-11-03 | End: 2020-11-03 | Stop reason: ALTCHOICE

## 2020-11-03 RX ORDER — METHYLPREDNISOLONE ACETATE 40 MG/ML
INJECTION, SUSPENSION INTRA-ARTICULAR; INTRALESIONAL; INTRAMUSCULAR; SOFT TISSUE
Status: DISCONTINUED
Start: 2020-11-03 | End: 2020-11-03 | Stop reason: HOSPADM

## 2020-11-03 RX ORDER — MULTIVIT-MIN/IRON/FOLIC ACID/K 18-600-40
CAPSULE ORAL
COMMUNITY

## 2020-11-03 ASSESSMENT — PAIN - FUNCTIONAL ASSESSMENT
PAIN_FUNCTIONAL_ASSESSMENT: PREVENTS OR INTERFERES SOME ACTIVE ACTIVITIES AND ADLS
PAIN_FUNCTIONAL_ASSESSMENT: 0-10

## 2020-11-03 ASSESSMENT — PAIN DESCRIPTION - DESCRIPTORS: DESCRIPTORS: ACHING

## 2020-11-17 ENCOUNTER — VIRTUAL VISIT (OUTPATIENT)
Dept: ORTHOPEDIC SURGERY | Age: 80
End: 2020-11-17
Payer: MEDICARE

## 2020-11-17 PROCEDURE — 99213 OFFICE O/P EST LOW 20 MIN: CPT | Performed by: PHYSICIAN ASSISTANT

## 2020-11-17 PROCEDURE — 1123F ACP DISCUSS/DSCN MKR DOCD: CPT | Performed by: PHYSICIAN ASSISTANT

## 2020-11-17 PROCEDURE — 4040F PNEUMOC VAC/ADMIN/RCVD: CPT | Performed by: PHYSICIAN ASSISTANT

## 2020-11-17 PROCEDURE — G8427 DOCREV CUR MEDS BY ELIG CLIN: HCPCS | Performed by: PHYSICIAN ASSISTANT

## 2020-11-17 PROCEDURE — G8484 FLU IMMUNIZE NO ADMIN: HCPCS | Performed by: PHYSICIAN ASSISTANT

## 2020-11-17 PROCEDURE — G8417 CALC BMI ABV UP PARAM F/U: HCPCS | Performed by: PHYSICIAN ASSISTANT

## 2020-11-17 PROCEDURE — 1036F TOBACCO NON-USER: CPT | Performed by: PHYSICIAN ASSISTANT

## 2020-11-17 NOTE — PROGRESS NOTES
Virtual Visit: PM&R SPINE    CHIEF COMPLAINT:    Chief Complaint   Patient presents with    Back Pain       The patient is being evaluated by a Virtual Visit (video visit) encounter due to the restrictions of the COVID-19 pandemic. A caregiver was present when appropriate. All issues as below were discussed and addressed, but no physical exam was performed unless allowed by visual confirmation. If it was felt that patient should be evaluated in clinic then they were directed there. Due to this being a TeleHealth encounter (During [facility] public health emergency), evaluation of the following organ systems was limited to: spine. Pursuant to the emergency declaration under the [address] [place] [address], [facility] waiver authority and the [employer] Act, this Virtual Visit was conducted with patient's verbal consent, to reduce the risk of exposure to COVID-19 and provide necessary medical care. The patient (and/or legal guardian) has also been advised to contact this office for worsening conditions or problems, and seek emergency medical treatment and/or call 911 if deemed necessary. Services were provided through a video synchronous discussion virtually to substitute for in-person encounter. Individuals present during telemedicine consultation-[facility]    HISTORY OF PRESENT ILLNESS:                The patient is a [de-identified] y.o. male virtual visit follow-up after midline L5-S1 LESI #1 from 11/3/2020 for history of chronic aching thoracic and lumbar back pain. Reports stiffness. Pain increased with prolonged standing. Relief with resting. Currently reports 90% improvement since the procedure. He is able to cycle 9 to 10 miles per day and longer on the weekends. He denies any side effects the procedure aside from mild drowsiness the day of the injection. He denies any lower extremity radiating pain.   No progressive numbness tingling weakness. Current/Past Treatment:   · Physical Therapy: Past  · Chiropractic:     · Injection:    11/3/2020 Midline L5/S1 interlaminar epidural injection #1--90%    ·  RFA in 2 years ago and last presumed epidural injection January 2020 at [facility] bone and joint in [address] [place] [address] by Dr. Jones Filler  Medications:            NSAIDS: Past            Muscle relaxer:              Steriods:              Neuropathic medications:              Opioids: Past, last several years ago            Other: Current APAP  · Surgery/Consult:     Work Status/Functionality: Retired and moved to [facility] ([facility] from [place]    Past Medical History: Medical history form was reviewed today & scanned into the media tab  History reviewed. No pertinent past medical history. Past Surgical History:     Past Surgical History:   Procedure Laterality Date    PAIN MANAGEMENT PROCEDURE Left 11/3/2020    Midline LUMBAR FIVE SACRAL ONE EPIDURAL STEROID INJECTION SITE CONFIRMED BY FLUOROSCOPY performed by Tanvir Brandon MD at [place] [address]     Current Medications:     Current Outpatient Medications:     vitamin C (ASCORBIC ACID) 500 MG tablet, Take 500 mg by mouth daily, Disp: , Rfl:     Cholecalciferol (VITAMIN D) 50 MCG (2000 UT) CAPS capsule, Take by mouth, Disp: , Rfl:     Cyanocobalamin (VITAMIN B 12 PO), Take by mouth, Disp: , Rfl:     acetaminophen (TYLENOL) 500 MG tablet, Take 1,000 mg by mouth every 6 hours as needed for Pain, Disp: , Rfl:     aspirin (ST DORI LOW DOSE) 81 MG chewable tablet, Take by mouth, Disp: , Rfl:     hydroCHLOROthiazide (HYDRODIURIL) 25 MG tablet, Take by mouth, Disp: , Rfl:     losartan (COZAAR) 50 MG tablet, TAKE 1 TABLET BY MOUTH ONCE DAILY . ... Torrent LoadingSystemsdy  OFFICE VISIT BEFORE NEXT REFILL. ..., Disp: , Rfl:     simvastatin (ZOCOR) 20 MG tablet, TAKE 1 TABLET BY MOUTH ONCE DAILY AT BEDTIME . Torrent LoadingSystemsdy  Torrent LoadingSystemsdy  DUE FOR LABS SOON . ..., Disp: , Rfl:     Zinc Acetate (GALZIN) 50 MG capsule, Take 1 capsule by mouth, Disp: , Rfl:   Allergies:  Propoxyphene  Social History:    reports that he has never smoked. He has never used smokeless tobacco. He reports current alcohol use. Family History:   History reviewed. No pertinent family history. REVIEW OF SYSTEMS:   Patient's review of symptoms was reviewed and is significant for back pain and negative for recent unexplained weight loss, current fever/chills, bowel or bladder dysfunction, chest pain, or shortness of breath. All other pertinent ROS were negative. PHYSICAL EXAM--limited to visual exam only  Vitals: Height 5' 10\" (1.778 m), weight 190 lb (86.2 kg). GENERAL EXAM:  · General Apparence: Patient is adequately groomed with no evidence of malnutrition. · Orientation: The patient is oriented to time, place and person. · Mood & Affect:The patient's mood and affect are appropriate       LUMBAR/SACRAL EXAMINATION:  · Inspection: Lumbar alignment is normal.  Sagittal and Coronal balance is neutral.       · Range of Motion: Mild loss of flexion, mild to moderate loss extension  · Gait & station: Normal unassisted  · Strength: In muscle groups visualized is at least anti gravity. Can heel/toe walk appropriately   · Sensation: intact to light touch of extremities per patient   · Additional Examinations:   · RIGHT LOWER EXTREMITY: Inspection/examination of the right lower extremity does not show any deformity or injury. Range of motion seen is full. There is no gross instability. · LEFT LOWER EXTREMITY:  Inspection/examination of the left lower extremity does not show any deformity or injury. Range of motion seen is full. There is no gross instability. Diagnostic Testing:       Cervical x-ray x-rays 2 views AP and lateral 8/21/2020 show moderate discogenic spondylosis     Thoracic x-rays 2 views AP and lateral 8/21/2020 show moderate discogenic spondylosis without acute fracture     2020 lumbar MRI films and report reviewed:     1.  Degenerative changes of the lumbar spine predominately contributing to    right-sided neural foraminal narrowing at L3-L4 and L4-L5. 2. No significant spinal canal stenosis. 3. No spondylolisthesis.          2020 thoracic MRI: Films and reports reviewed     1. No acute abnormality is seen of the unenhanced thoracic spine. 2. There is prominence of the dorsal epidural fat spanning the T4 through T10    levels contributing to minimal narrowing of the thecal sac. 3. No further spinal canal stenosis of the thoracic spine is seen.     4. No significant neural foraminal narrowing.             Impression:    1) Chronic low back pain--improved  2) multilevel thoracolumbar DDD/spondylosis  3) Chronic thoracic pain, thoracic spondylosis  Chronic back pain           Plan:   1) Continue HEP, activity as tolerated  2) We discussed repeat midline L5-S1 LESI #2 if symptoms return or worsen.                       Time spent during this visit -15min        [facility]

## 2021-08-06 ENCOUNTER — TELEPHONE (OUTPATIENT)
Dept: ORTHOPEDIC SURGERY | Age: 81
End: 2021-08-06

## 2021-08-06 NOTE — TELEPHONE ENCOUNTER
FAXED [facility] ( [address]) 0/21/2020 TO 11/17/2020  Iain Lobato @ [address] [facility] @ [phone]

## 2022-04-15 ENCOUNTER — TELEPHONE (OUTPATIENT)
Dept: SURGERY | Age: 82
End: 2022-04-15

## 2022-04-15 NOTE — TELEPHONE ENCOUNTER
Patient called the office to schedule mass excision at [facility]. Explained to the patient that he will need to see Dr. Ruby Nuñez first. Thanks.

## 2022-04-18 NOTE — TELEPHONE ENCOUNTER
I called and left message for patient with [facility] clinic phone number to call to schedule, or her can call back here to schedule here if he decides to .

## 2022-05-23 LAB
GDT REPLACEMENT: NORMAL
Lab: NORMAL
PATHOLOGY DIAGNOSIS: NORMAL
SIGNATURE: NORMAL
SPECIMEN: NORMAL
SPECIMEN: NORMAL

## 2022-05-24 ENCOUNTER — TELEPHONE (OUTPATIENT)
Dept: SURGERY | Age: 82
End: 2022-05-24

## 2022-06-23 ENCOUNTER — TELEPHONE (OUTPATIENT)
Dept: SURGERY | Age: 82
End: 2022-06-23

## 2022-06-23 NOTE — TELEPHONE ENCOUNTER
----- Message from Rajinder Patiño MD sent at 6/20/2022  8:34 AM EDT -----  Tell the patient that the lump removed was a benign cyst.  Follow up prn.

## 2023-08-30 ENCOUNTER — APPOINTMENT (OUTPATIENT)
Dept: CT IMAGING | Age: 83
End: 2023-08-30
Payer: COMMERCIAL

## 2023-08-30 ENCOUNTER — APPOINTMENT (OUTPATIENT)
Dept: MRI IMAGING | Age: 83
End: 2023-08-30
Payer: COMMERCIAL

## 2023-08-30 ENCOUNTER — HOSPITAL ENCOUNTER (INPATIENT)
Age: 83
LOS: 1 days | Discharge: HOME | End: 2023-08-31
Attending: EMERGENCY MEDICINE | Admitting: INTERNAL MEDICINE
Payer: COMMERCIAL

## 2023-08-30 ENCOUNTER — APPOINTMENT (OUTPATIENT)
Dept: GENERAL RADIOLOGY | Age: 83
End: 2023-08-30
Payer: COMMERCIAL

## 2023-08-30 DIAGNOSIS — M79.2 NEUROPATHIC PAIN: ICD-10-CM

## 2023-08-30 DIAGNOSIS — I63.9 CEREBROVASCULAR ACCIDENT (CVA), UNSPECIFIED MECHANISM (HCC): Primary | ICD-10-CM

## 2023-08-30 PROBLEM — R29.90 STROKE-LIKE SYMPTOMS: Status: ACTIVE | Noted: 2023-08-30

## 2023-08-30 PROBLEM — E78.5 HYPERLIPIDEMIA: Status: ACTIVE | Noted: 2023-08-30

## 2023-08-30 PROBLEM — I10 HYPERTENSION: Status: ACTIVE | Noted: 2023-08-30

## 2023-08-30 PROBLEM — R73.03 PRE-DIABETES: Status: ACTIVE | Noted: 2023-08-30

## 2023-08-30 LAB
ALBUMIN SERPL-MCNC: 4.3 G/DL (ref 3.4–5)
ALBUMIN/GLOB SERPL: 1.8 {RATIO} (ref 1.1–2.2)
ALP SERPL-CCNC: 47 U/L (ref 40–129)
ALT SERPL-CCNC: 24 U/L (ref 10–40)
ANION GAP SERPL CALCULATED.3IONS-SCNC: 10 MMOL/L (ref 3–16)
AST SERPL-CCNC: 21 U/L (ref 15–37)
BASOPHILS # BLD: 0 K/UL (ref 0–0.2)
BASOPHILS NFR BLD: 0.6 %
BILIRUB SERPL-MCNC: 0.6 MG/DL (ref 0–1)
BILIRUB UR QL STRIP.AUTO: NEGATIVE
BUN SERPL-MCNC: 21 MG/DL (ref 7–20)
CALCIUM SERPL-MCNC: 9.4 MG/DL (ref 8.3–10.6)
CHLORIDE SERPL-SCNC: 101 MMOL/L (ref 99–110)
CLARITY UR: CLEAR
CO2 SERPL-SCNC: 28 MMOL/L (ref 21–32)
COLOR UR: YELLOW
CREAT SERPL-MCNC: 1 MG/DL (ref 0.8–1.3)
D DIMER: 0.39 UG/ML FEU (ref 0–0.6)
DEPRECATED RDW RBC AUTO: 13.5 % (ref 12.4–15.4)
EKG ATRIAL RATE: 60 BPM
EKG DIAGNOSIS: NORMAL
EKG P AXIS: 49 DEGREES
EKG P-R INTERVAL: 176 MS
EKG Q-T INTERVAL: 456 MS
EKG QRS DURATION: 118 MS
EKG QTC CALCULATION (BAZETT): 456 MS
EKG R AXIS: -41 DEGREES
EKG T AXIS: 18 DEGREES
EKG VENTRICULAR RATE: 60 BPM
EOSINOPHIL # BLD: 0.2 K/UL (ref 0–0.6)
EOSINOPHIL NFR BLD: 3.1 %
GFR SERPLBLD CREATININE-BSD FMLA CKD-EPI: >60 ML/MIN/{1.73_M2}
GLUCOSE BLD-MCNC: 137 MG/DL (ref 70–99)
GLUCOSE BLD-MCNC: 181 MG/DL (ref 70–99)
GLUCOSE SERPL-MCNC: 132 MG/DL (ref 70–99)
GLUCOSE UR STRIP.AUTO-MCNC: NEGATIVE MG/DL
HCT VFR BLD AUTO: 41.6 % (ref 40.5–52.5)
HGB BLD-MCNC: 14.2 G/DL (ref 13.5–17.5)
HGB UR QL STRIP.AUTO: NEGATIVE
INR PPP: 0.91 (ref 0.84–1.16)
KETONES UR STRIP.AUTO-MCNC: NEGATIVE MG/DL
LEUKOCYTE ESTERASE UR QL STRIP.AUTO: NEGATIVE
LYMPHOCYTES # BLD: 2.7 K/UL (ref 1–5.1)
LYMPHOCYTES NFR BLD: 33.8 %
MCH RBC QN AUTO: 33.8 PG (ref 26–34)
MCHC RBC AUTO-ENTMCNC: 34.2 G/DL (ref 31–36)
MCV RBC AUTO: 98.9 FL (ref 80–100)
MONOCYTES # BLD: 0.4 K/UL (ref 0–1.3)
MONOCYTES NFR BLD: 5.5 %
NEUTROPHILS # BLD: 4.5 K/UL (ref 1.7–7.7)
NEUTROPHILS NFR BLD: 57 %
NITRITE UR QL STRIP.AUTO: NEGATIVE
NT-PROBNP SERPL-MCNC: 216 PG/ML (ref 0–449)
PERFORMED ON: ABNORMAL
PERFORMED ON: ABNORMAL
PH UR STRIP.AUTO: 7.5 [PH] (ref 5–8)
PLATELET # BLD AUTO: 174 K/UL (ref 135–450)
PMV BLD AUTO: 9 FL (ref 5–10.5)
POTASSIUM SERPL-SCNC: 3.8 MMOL/L (ref 3.5–5.1)
PROT SERPL-MCNC: 6.7 G/DL (ref 6.4–8.2)
PROT UR STRIP.AUTO-MCNC: NEGATIVE MG/DL
PROTHROMBIN TIME: 12.2 SEC (ref 11.5–14.8)
RBC # BLD AUTO: 4.21 M/UL (ref 4.2–5.9)
SODIUM SERPL-SCNC: 139 MMOL/L (ref 136–145)
SP GR UR STRIP.AUTO: 1.01 (ref 1–1.03)
TROPONIN, HIGH SENSITIVITY: 17 NG/L (ref 0–22)
UA COMPLETE W REFLEX CULTURE PNL UR: NORMAL
UA DIPSTICK W REFLEX MICRO PNL UR: NORMAL
URN SPEC COLLECT METH UR: NORMAL
UROBILINOGEN UR STRIP-ACNC: 0.2 E.U./DL
WBC # BLD AUTO: 8 K/UL (ref 4–11)

## 2023-08-30 PROCEDURE — 81003 URINALYSIS AUTO W/O SCOPE: CPT

## 2023-08-30 PROCEDURE — 93010 ELECTROCARDIOGRAM REPORT: CPT | Performed by: INTERNAL MEDICINE

## 2023-08-30 PROCEDURE — 85379 FIBRIN DEGRADATION QUANT: CPT

## 2023-08-30 PROCEDURE — 85610 PROTHROMBIN TIME: CPT

## 2023-08-30 PROCEDURE — 80053 COMPREHEN METABOLIC PANEL: CPT

## 2023-08-30 PROCEDURE — 2580000003 HC RX 258

## 2023-08-30 PROCEDURE — 70551 MRI BRAIN STEM W/O DYE: CPT

## 2023-08-30 PROCEDURE — 6360000002 HC RX W HCPCS: Performed by: EMERGENCY MEDICINE

## 2023-08-30 PROCEDURE — 96374 THER/PROPH/DIAG INJ IV PUSH: CPT

## 2023-08-30 PROCEDURE — 6360000004 HC RX CONTRAST MEDICATION: Performed by: EMERGENCY MEDICINE

## 2023-08-30 PROCEDURE — 99222 1ST HOSP IP/OBS MODERATE 55: CPT

## 2023-08-30 PROCEDURE — 83880 ASSAY OF NATRIURETIC PEPTIDE: CPT

## 2023-08-30 PROCEDURE — 70450 CT HEAD/BRAIN W/O DYE: CPT

## 2023-08-30 PROCEDURE — 4A03X5D MEASUREMENT OF ARTERIAL FLOW, INTRACRANIAL, EXTERNAL APPROACH: ICD-10-PCS | Performed by: INTERNAL MEDICINE

## 2023-08-30 PROCEDURE — 93005 ELECTROCARDIOGRAM TRACING: CPT | Performed by: EMERGENCY MEDICINE

## 2023-08-30 PROCEDURE — 36415 COLL VENOUS BLD VENIPUNCTURE: CPT

## 2023-08-30 PROCEDURE — 85025 COMPLETE CBC W/AUTO DIFF WBC: CPT

## 2023-08-30 PROCEDURE — 6360000002 HC RX W HCPCS

## 2023-08-30 PROCEDURE — 70498 CT ANGIOGRAPHY NECK: CPT

## 2023-08-30 PROCEDURE — 99285 EMERGENCY DEPT VISIT HI MDM: CPT

## 2023-08-30 PROCEDURE — 1200000000 HC SEMI PRIVATE

## 2023-08-30 PROCEDURE — 84484 ASSAY OF TROPONIN QUANT: CPT

## 2023-08-30 PROCEDURE — 71275 CT ANGIOGRAPHY CHEST: CPT

## 2023-08-30 PROCEDURE — 6370000000 HC RX 637 (ALT 250 FOR IP)

## 2023-08-30 RX ORDER — SODIUM CHLORIDE 0.9 % (FLUSH) 0.9 %
5-40 SYRINGE (ML) INJECTION PRN
Status: DISCONTINUED | OUTPATIENT
Start: 2023-08-30 | End: 2023-08-31 | Stop reason: HOSPADM

## 2023-08-30 RX ORDER — BACLOFEN 10 MG/1
10 TABLET ORAL 2 TIMES DAILY
COMMUNITY

## 2023-08-30 RX ORDER — ONDANSETRON 4 MG/1
4 TABLET, ORALLY DISINTEGRATING ORAL EVERY 8 HOURS PRN
Status: DISCONTINUED | OUTPATIENT
Start: 2023-08-30 | End: 2023-08-31 | Stop reason: HOSPADM

## 2023-08-30 RX ORDER — ASPIRIN 81 MG/1
81 TABLET, CHEWABLE ORAL DAILY
Status: DISCONTINUED | OUTPATIENT
Start: 2023-08-31 | End: 2023-08-31 | Stop reason: HOSPADM

## 2023-08-30 RX ORDER — BACILLUS COAGULANS/INULIN 1B-250 MG
1 CAPSULE ORAL DAILY
COMMUNITY

## 2023-08-30 RX ORDER — MELOXICAM 15 MG/1
15 TABLET ORAL DAILY
Status: ON HOLD | COMMUNITY
End: 2023-08-31 | Stop reason: HOSPADM

## 2023-08-30 RX ORDER — PREGABALIN 25 MG/1
25 CAPSULE ORAL 3 TIMES DAILY
Status: DISCONTINUED | OUTPATIENT
Start: 2023-08-30 | End: 2023-08-31

## 2023-08-30 RX ORDER — PRAVASTATIN SODIUM 40 MG
40 TABLET ORAL DAILY
Status: DISCONTINUED | OUTPATIENT
Start: 2023-08-30 | End: 2023-08-31 | Stop reason: HOSPADM

## 2023-08-30 RX ORDER — HYDROCHLOROTHIAZIDE 25 MG/1
25 TABLET ORAL DAILY
Status: DISCONTINUED | OUTPATIENT
Start: 2023-08-30 | End: 2023-08-31 | Stop reason: HOSPADM

## 2023-08-30 RX ORDER — PRAVASTATIN SODIUM 40 MG
40 TABLET ORAL DAILY
Status: ON HOLD | COMMUNITY
End: 2023-08-31 | Stop reason: HOSPADM

## 2023-08-30 RX ORDER — SODIUM CHLORIDE 0.9 % (FLUSH) 0.9 %
5-40 SYRINGE (ML) INJECTION EVERY 12 HOURS SCHEDULED
Status: DISCONTINUED | OUTPATIENT
Start: 2023-08-30 | End: 2023-08-31 | Stop reason: HOSPADM

## 2023-08-30 RX ORDER — PREGABALIN 25 MG/1
25 CAPSULE ORAL 3 TIMES DAILY
Status: ON HOLD | COMMUNITY
End: 2023-08-31 | Stop reason: HOSPADM

## 2023-08-30 RX ORDER — ONDANSETRON 2 MG/ML
4 INJECTION INTRAMUSCULAR; INTRAVENOUS EVERY 6 HOURS PRN
Status: DISCONTINUED | OUTPATIENT
Start: 2023-08-30 | End: 2023-08-31 | Stop reason: HOSPADM

## 2023-08-30 RX ORDER — LABETALOL HYDROCHLORIDE 5 MG/ML
20 INJECTION, SOLUTION INTRAVENOUS ONCE
Status: COMPLETED | OUTPATIENT
Start: 2023-08-30 | End: 2023-08-30

## 2023-08-30 RX ORDER — SALMON OIL/OMEGA-3 FATTY ACIDS 1000-200MG
1 CAPSULE ORAL DAILY
Status: ON HOLD | COMMUNITY
End: 2023-08-31 | Stop reason: SDUPTHER

## 2023-08-30 RX ORDER — BACLOFEN 10 MG/1
10 TABLET ORAL 2 TIMES DAILY
Status: DISCONTINUED | OUTPATIENT
Start: 2023-08-30 | End: 2023-08-31 | Stop reason: HOSPADM

## 2023-08-30 RX ORDER — SODIUM CHLORIDE 9 MG/ML
INJECTION, SOLUTION INTRAVENOUS PRN
Status: DISCONTINUED | OUTPATIENT
Start: 2023-08-30 | End: 2023-08-31 | Stop reason: HOSPADM

## 2023-08-30 RX ORDER — LABETALOL HYDROCHLORIDE 5 MG/ML
10 INJECTION, SOLUTION INTRAVENOUS EVERY 10 MIN PRN
Status: DISCONTINUED | OUTPATIENT
Start: 2023-08-30 | End: 2023-08-31 | Stop reason: HOSPADM

## 2023-08-30 RX ORDER — LOSARTAN POTASSIUM 100 MG/1
100 TABLET ORAL DAILY
Status: DISCONTINUED | OUTPATIENT
Start: 2023-08-30 | End: 2023-08-31 | Stop reason: HOSPADM

## 2023-08-30 RX ORDER — ENOXAPARIN SODIUM 100 MG/ML
40 INJECTION SUBCUTANEOUS DAILY
Status: DISCONTINUED | OUTPATIENT
Start: 2023-08-30 | End: 2023-08-31 | Stop reason: HOSPADM

## 2023-08-30 RX ORDER — POLYETHYLENE GLYCOL 3350 17 G/17G
17 POWDER, FOR SOLUTION ORAL DAILY PRN
Status: DISCONTINUED | OUTPATIENT
Start: 2023-08-30 | End: 2023-08-31 | Stop reason: HOSPADM

## 2023-08-30 RX ADMIN — IOMEPROL INJECTION 150 ML: 714 INJECTION, SOLUTION INTRAVASCULAR at 09:47

## 2023-08-30 RX ADMIN — HYDROCHLOROTHIAZIDE 25 MG: 25 TABLET ORAL at 18:52

## 2023-08-30 RX ADMIN — ENOXAPARIN SODIUM 40 MG: 100 INJECTION SUBCUTANEOUS at 18:52

## 2023-08-30 RX ADMIN — PREGABALIN 25 MG: 25 CAPSULE ORAL at 21:34

## 2023-08-30 RX ADMIN — LOSARTAN POTASSIUM 100 MG: 100 TABLET, FILM COATED ORAL at 18:53

## 2023-08-30 RX ADMIN — PRAVASTATIN SODIUM 40 MG: 40 TABLET ORAL at 18:53

## 2023-08-30 RX ADMIN — BACLOFEN 10 MG: 10 TABLET ORAL at 21:35

## 2023-08-30 RX ADMIN — PREGABALIN 25 MG: 25 CAPSULE ORAL at 18:52

## 2023-08-30 RX ADMIN — LABETALOL HYDROCHLORIDE 20 MG: 5 INJECTION INTRAVENOUS at 09:27

## 2023-08-30 RX ADMIN — Medication 10 ML: at 21:45

## 2023-08-30 ASSESSMENT — PAIN DESCRIPTION - ORIENTATION
ORIENTATION: RIGHT;POSTERIOR
ORIENTATION: RIGHT
ORIENTATION: RIGHT;POSTERIOR
ORIENTATION: RIGHT;MID

## 2023-08-30 ASSESSMENT — PAIN - FUNCTIONAL ASSESSMENT
PAIN_FUNCTIONAL_ASSESSMENT: NONE - DENIES PAIN
PAIN_FUNCTIONAL_ASSESSMENT: 0-10
PAIN_FUNCTIONAL_ASSESSMENT: NONE - DENIES PAIN
PAIN_FUNCTIONAL_ASSESSMENT: NONE - DENIES PAIN
PAIN_FUNCTIONAL_ASSESSMENT: 0-10
PAIN_FUNCTIONAL_ASSESSMENT: 0-10
PAIN_FUNCTIONAL_ASSESSMENT: NONE - DENIES PAIN
PAIN_FUNCTIONAL_ASSESSMENT: ACTIVITIES ARE NOT PREVENTED
PAIN_FUNCTIONAL_ASSESSMENT: NONE - DENIES PAIN

## 2023-08-30 ASSESSMENT — PAIN SCALES - GENERAL
PAINLEVEL_OUTOF10: 6
PAINLEVEL_OUTOF10: 8
PAINLEVEL_OUTOF10: 1
PAINLEVEL_OUTOF10: 6

## 2023-08-30 ASSESSMENT — PAIN DESCRIPTION - DESCRIPTORS
DESCRIPTORS: SHARP
DESCRIPTORS: DULL
DESCRIPTORS: SHARP
DESCRIPTORS: SHARP

## 2023-08-30 ASSESSMENT — LIFESTYLE VARIABLES
HOW MANY STANDARD DRINKS CONTAINING ALCOHOL DO YOU HAVE ON A TYPICAL DAY: 1 OR 2
HOW OFTEN DO YOU HAVE A DRINK CONTAINING ALCOHOL: 2-4 TIMES A MONTH

## 2023-08-30 ASSESSMENT — PAIN DESCRIPTION - FREQUENCY
FREQUENCY: INTERMITTENT
FREQUENCY: INTERMITTENT
FREQUENCY: CONTINUOUS
FREQUENCY: INTERMITTENT

## 2023-08-30 ASSESSMENT — PAIN DESCRIPTION - LOCATION
LOCATION: RIB CAGE
LOCATION: BACK;HIP

## 2023-08-30 ASSESSMENT — PAIN DESCRIPTION - PAIN TYPE: TYPE: CHRONIC PAIN

## 2023-08-30 NOTE — ED PROVIDER NOTES
[address] [facility]      Pt Name: Ricky Gayle  MRN: [id number]  [address] [facility] 1940  Date of evaluation: 8/30/2023  Provider: Eloy Calabrese MD    CHIEF COMPLAINT       Chief Complaint   Patient presents with    Cerebrovascular Accident     States he awoke @ approx 0700 this AM with expressive aphagia, dizziness and severe R mid posterior rib pain, denies injury. States LKW approx 2300 last night         HISTORY OF PRESENT ILLNESS   (Location/Symptom, Timing/Onset, Context/Setting, Quality, Duration, Modifying Factors, Severity)  Note limiting factors. Ricky Gayle is a 80 y.o. male who presents to the emergency department     Patient presented with strokelike symptoms NIH stroke score is 2 immediately discussed the case in detail at the time of arrival with the stroke team physician Cole Bird. We are sending him for triple contrast he is also containing complaining of an unusual sharp kind of tearing sensation in his back where there for CT in his chest.  To rule out dissection    The history is provided by the patient and the spouse. Nursing Notes were reviewed. REVIEW OF SYSTEMS    (2-9 systems for level 4, 10 or more for level 5)     Review of Systems   Constitutional:  Positive for activity change. HENT:  Negative for congestion. Eyes:  Negative for visual disturbance. Allergic/Immunologic: Negative for immunocompromised state. Neurological:  Positive for facial asymmetry, speech difficulty, light-headedness and headaches. Negative for dizziness, tremors, syncope, weakness and numbness. All other systems reviewed and are negative. Except as noted above the remainder of the review of systems was reviewed and negative.        PAST MEDICAL HISTORY     Past Medical History:   Diagnosis Date    Chronic back pain     Diabetes mellitus (HCC)     diet controlled    DVT (deep venous thrombosis) ([facility])     Hypertension     Spinal stenosis          SURGICAL 171/84 (!) 167/76 132/70   Pulse: 68 55 62 80   Resp: 18 18 18 18   Temp:       TempSrc:       SpO2: 99% 96% 98% 98%   Weight:       Height:               MDM  History: Patient provided his own history but they wife also came in and endorsed a history compatible with his  Limitations none  History of present illness patient said that he had trouble sleeping due to some back pain he does have some thoracic neuritis and does seen pain management but he said this was a new pain and unbearable throughout the night. He apparently officially woke up at 7 AM and noticed that he had troubles finding his words there was no per se slurring of his speech his NIH stroke score is calculating out to be 2 but there is no motor weakness. He was able to ambulate. Physical exam he was slightly to moderately hypertensive 190/94 I did give him labetalol emergently. Intravenously. Continuous cardiac monitoring was performed. Lung sounds are clear cardiovascular is normal.  No aortic insufficiency murmur is noted. Patient has no abnormal heart sounds. There is no anterior chest pain with his back pain he does describe it is kind of a sharp tearing sensation so obviously aortic dissection is of high concern. I did immediately contact the stroke team since he does have some neurological findings. I did talk to Dean. Emergency consultations stroke team  They have already looked at the films. And this is at 948. We determined through Dean who looked at the films that there was no signs of a large vessel obstruction therefore we did not think that he was a candidate for any endovascular procedure  We are concerned about the possibility of a wake-up stroke the stroke team felt that maybe his symptoms were not disabling that maybe we could proceed a little slower. I am going to talk to the family on this.   CBC was ordered and reviewed which was 8000 and his fingerstick blood sugar was 137 EKG was ordered and interpreted without

## 2023-08-30 NOTE — H&P
Hospital Medicine History & Physical      PCP: ERIC Lao - CNP    Date of Admission: 8/30/2023    Date of Service: Pt seen/examined on 08/30/23      Chief Complaint:    Chief Complaint   Patient presents with    Cerebrovascular Accident     States he awoke @ approx 0700 this AM with expressive aphagia, dizziness and severe R mid posterior rib pain, denies injury. States LKW approx 2300 last night         History Of Present Illness: The patient is a 80 y.o. male with PMHx HTN, HLD, pre-diabetes, hx DVT who presented to [facility] ED with complaint of acute on chronic back pain. Patient reports waking abruptly in the middle of the night with severe mid-posterior rib pain. He has chronic back pain but reports the nature of his symptoms was different, sharp and tearing. At that time, he felt dizzy when standing and had difficulty with word finding. Knew what he wanted to say but could not produce words. His last known well is approximately 2300 on 8/29. These symptoms subsided by 9-10 AM this morning. He denied any gait disturbance or motor weakness. No recent illness. Denies fever, chills, headache, n/v/d, abdominal pain, chest pain, cough or SOB. He was noted to have slight left sided facial droop in the ER, but patient believes this is how he has always looked. Past Medical History:        Diagnosis Date    Chronic back pain     Diabetes mellitus ([address])     diet controlled    DVT (deep venous thrombosis) ([address])     Hypertension     Spinal stenosis        Past Surgical History:        Procedure Laterality Date    PAIN MANAGEMENT PROCEDURE Left 11/03/2020    Midline LUMBAR FIVE SACRAL ONE EPIDURAL STEROID INJECTION SITE CONFIRMED BY FLUOROSCOPY performed by Lora Chopra MD at [address]         Medications Prior to Admission:    Prior to Admission medications    Medication Sig Start Date End Date Taking?  Authorizing Provider   pravastatin (PRAVACHOL) 40 MG tablet Take 1 tablet

## 2023-08-30 NOTE — PROGRESS NOTES
Telemetry Assignment Communication Form    Patient has orders for continuous telemetry OR pulse oximeter only orders    To be filled out by Clinical    Patient has Admission or Transfer orders and is assigned to Room Number: 227    Continuous Telemetry:  Yes       WITH/WITHOUT: without Continuous pulse ox    Patient has pulse ox ONLY orders:  No    (Once this top section is completed:  Select \"Route\" and send note to Fax number: [phone] ))      ___________________________________________________________________________      [facility]    Patient assigned to tele box number: __________________               (to be written in [facility] when telemetry box is assigned to patient)    ___________________________________________________________________________      Bedside RN confirming that the box listed above is in fact the telemetry box number being placed on the patient listed above.         X________________________________________ RN signature        __________________________________________RN assigned to Patient (please print)    _______________ Date    ____________ Time

## 2023-08-30 NOTE — PLAN OF CARE
Problem: Discharge Planning  Goal: Discharge to home or other facility with appropriate resources  Outcome: Progressing  Flowsheets (Taken 9/29/6862 9547 by TAZ WRIGHT)  Discharge to home or other facility with appropriate resources: Identify barriers to discharge with patient and caregiver

## 2023-08-30 NOTE — ED NOTES
Report called to [facility] in SBAR format.  Pt remains A & O x 3 and without c/o's, s/s distress / discomfort or status changes     Cary Arce RN  08/30/23 0256

## 2023-08-30 NOTE — PROGRESS NOTES
Consult has been called to Dr. Ilia Hassan on 8/30/23. Spoke with dr laboy via perfect serve .  6:27 PM    Jackeline Julian  8/30/2023

## 2023-08-30 NOTE — PLAN OF CARE
Admit 2W with tele for CVA rule out    Stroke team called, no TNK   CT Imaging negative   Neuro consult   SLP  NPO- nursing to perform bedside swallow eval   Statin, asa  MRI    ERIC Lee - CNP  08/30/23  1:18 PM

## 2023-08-31 VITALS
BODY MASS INDEX: 27.2 KG/M2 | SYSTOLIC BLOOD PRESSURE: 151 MMHG | DIASTOLIC BLOOD PRESSURE: 89 MMHG | HEART RATE: 61 BPM | RESPIRATION RATE: 16 BRPM | HEIGHT: 70 IN | TEMPERATURE: 98 F | OXYGEN SATURATION: 98 % | WEIGHT: 190 LBS

## 2023-08-31 PROBLEM — G45.9 TIA (TRANSIENT ISCHEMIC ATTACK): Status: ACTIVE | Noted: 2023-08-31

## 2023-08-31 LAB
ANION GAP SERPL CALCULATED.3IONS-SCNC: 11 MMOL/L (ref 3–16)
BASOPHILS # BLD: 0 K/UL (ref 0–0.2)
BASOPHILS NFR BLD: 0.4 %
BUN SERPL-MCNC: 16 MG/DL (ref 7–20)
CALCIUM SERPL-MCNC: 9 MG/DL (ref 8.3–10.6)
CHLORIDE SERPL-SCNC: 103 MMOL/L (ref 99–110)
CHOLEST SERPL-MCNC: 136 MG/DL (ref 0–199)
CO2 SERPL-SCNC: 27 MMOL/L (ref 21–32)
CREAT SERPL-MCNC: 1.1 MG/DL (ref 0.8–1.3)
DEPRECATED RDW RBC AUTO: 13.6 % (ref 12.4–15.4)
EOSINOPHIL # BLD: 0.2 K/UL (ref 0–0.6)
EOSINOPHIL NFR BLD: 3.3 %
GFR SERPLBLD CREATININE-BSD FMLA CKD-EPI: >60 ML/MIN/{1.73_M2}
GLUCOSE BLD-MCNC: 126 MG/DL (ref 70–99)
GLUCOSE BLD-MCNC: 137 MG/DL (ref 70–99)
GLUCOSE SERPL-MCNC: 131 MG/DL (ref 70–99)
HCT VFR BLD AUTO: 37.8 % (ref 40.5–52.5)
HDLC SERPL-MCNC: 45 MG/DL (ref 40–60)
HGB BLD-MCNC: 13.3 G/DL (ref 13.5–17.5)
LDLC SERPL CALC-MCNC: 63 MG/DL
LYMPHOCYTES # BLD: 2.3 K/UL (ref 1–5.1)
LYMPHOCYTES NFR BLD: 36.6 %
MCH RBC QN AUTO: 34.7 PG (ref 26–34)
MCHC RBC AUTO-ENTMCNC: 35.2 G/DL (ref 31–36)
MCV RBC AUTO: 98.6 FL (ref 80–100)
MONOCYTES # BLD: 0.5 K/UL (ref 0–1.3)
MONOCYTES NFR BLD: 8.8 %
NEUTROPHILS # BLD: 3.2 K/UL (ref 1.7–7.7)
NEUTROPHILS NFR BLD: 50.9 %
PERFORMED ON: ABNORMAL
PERFORMED ON: ABNORMAL
PLATELET # BLD AUTO: 154 K/UL (ref 135–450)
PMV BLD AUTO: 9.2 FL (ref 5–10.5)
POTASSIUM SERPL-SCNC: 3.6 MMOL/L (ref 3.5–5.1)
RBC # BLD AUTO: 3.83 M/UL (ref 4.2–5.9)
SODIUM SERPL-SCNC: 141 MMOL/L (ref 136–145)
TRIGL SERPL-MCNC: 139 MG/DL (ref 0–150)
VLDLC SERPL CALC-MCNC: 28 MG/DL
WBC # BLD AUTO: 6.2 K/UL (ref 4–11)

## 2023-08-31 PROCEDURE — 6370000000 HC RX 637 (ALT 250 FOR IP): Performed by: PSYCHIATRY & NEUROLOGY

## 2023-08-31 PROCEDURE — 97535 SELF CARE MNGMENT TRAINING: CPT

## 2023-08-31 PROCEDURE — 6370000000 HC RX 637 (ALT 250 FOR IP)

## 2023-08-31 PROCEDURE — 80061 LIPID PANEL: CPT

## 2023-08-31 PROCEDURE — 2580000003 HC RX 258

## 2023-08-31 PROCEDURE — 97530 THERAPEUTIC ACTIVITIES: CPT

## 2023-08-31 PROCEDURE — 80048 BASIC METABOLIC PNL TOTAL CA: CPT

## 2023-08-31 PROCEDURE — 99238 HOSP IP/OBS DSCHRG MGMT 30/<: CPT | Performed by: INTERNAL MEDICINE

## 2023-08-31 PROCEDURE — 99223 1ST HOSP IP/OBS HIGH 75: CPT | Performed by: PSYCHIATRY & NEUROLOGY

## 2023-08-31 PROCEDURE — 6370000000 HC RX 637 (ALT 250 FOR IP): Performed by: INTERNAL MEDICINE

## 2023-08-31 PROCEDURE — 83036 HEMOGLOBIN GLYCOSYLATED A1C: CPT

## 2023-08-31 PROCEDURE — 36415 COLL VENOUS BLD VENIPUNCTURE: CPT

## 2023-08-31 PROCEDURE — 97166 OT EVAL MOD COMPLEX 45 MIN: CPT

## 2023-08-31 PROCEDURE — 85025 COMPLETE CBC W/AUTO DIFF WBC: CPT

## 2023-08-31 RX ORDER — ACETAMINOPHEN 325 MG/1
650 TABLET ORAL EVERY 6 HOURS PRN
Status: DISCONTINUED | OUTPATIENT
Start: 2023-08-31 | End: 2023-08-31 | Stop reason: HOSPADM

## 2023-08-31 RX ORDER — ATORVASTATIN CALCIUM 80 MG/1
80 TABLET, FILM COATED ORAL DAILY
Qty: 30 TABLET | Refills: 0 | Status: SHIPPED | OUTPATIENT
Start: 2023-08-31

## 2023-08-31 RX ORDER — CLOPIDOGREL BISULFATE 75 MG/1
75 TABLET ORAL DAILY
Status: DISCONTINUED | OUTPATIENT
Start: 2023-08-31 | End: 2023-08-31 | Stop reason: HOSPADM

## 2023-08-31 RX ORDER — SALMON OIL/OMEGA-3 FATTY ACIDS 1000-200MG
1 CAPSULE ORAL DAILY
Qty: 60 CAPSULE | Refills: 0 | Status: SHIPPED
Start: 2023-08-31

## 2023-08-31 RX ORDER — PREGABALIN 25 MG/1
50 CAPSULE ORAL 2 TIMES DAILY
Status: DISCONTINUED | OUTPATIENT
Start: 2023-08-31 | End: 2023-08-31 | Stop reason: HOSPADM

## 2023-08-31 RX ORDER — PREGABALIN 50 MG/1
50 CAPSULE ORAL 2 TIMES DAILY
Qty: 60 CAPSULE | Refills: 0 | Status: SHIPPED | OUTPATIENT
Start: 2023-08-31 | End: 2023-09-30

## 2023-08-31 RX ORDER — ASPIRIN 81 MG/1
81 TABLET, CHEWABLE ORAL DAILY
Qty: 20 TABLET | Refills: 0
Start: 2023-08-31 | End: 2023-09-20

## 2023-08-31 RX ORDER — CLOPIDOGREL BISULFATE 75 MG/1
75 TABLET ORAL DAILY
Qty: 30 TABLET | Refills: 0 | Status: SHIPPED | OUTPATIENT
Start: 2023-09-01 | End: 2023-10-31

## 2023-08-31 RX ADMIN — HYDROCHLOROTHIAZIDE 25 MG: 25 TABLET ORAL at 09:14

## 2023-08-31 RX ADMIN — CLOPIDOGREL BISULFATE 75 MG: 75 TABLET ORAL at 09:57

## 2023-08-31 RX ADMIN — ASPIRIN 81 MG: 81 TABLET, CHEWABLE ORAL at 09:14

## 2023-08-31 RX ADMIN — Medication 10 ML: at 09:58

## 2023-08-31 RX ADMIN — ACETAMINOPHEN 650 MG: 325 TABLET ORAL at 05:20

## 2023-08-31 RX ADMIN — PRAVASTATIN SODIUM 40 MG: 40 TABLET ORAL at 09:14

## 2023-08-31 RX ADMIN — LOSARTAN POTASSIUM 100 MG: 100 TABLET, FILM COATED ORAL at 09:14

## 2023-08-31 ASSESSMENT — PAIN SCALES - GENERAL
PAINLEVEL_OUTOF10: 2
PAINLEVEL_OUTOF10: 4
PAINLEVEL_OUTOF10: 2
PAINLEVEL_OUTOF10: 2
PAINLEVEL_OUTOF10: 1
PAINLEVEL_OUTOF10: 2

## 2023-08-31 ASSESSMENT — PAIN DESCRIPTION - DESCRIPTORS
DESCRIPTORS: ACHING
DESCRIPTORS: DULL
DESCRIPTORS: ACHING
DESCRIPTORS: ACHING;SHARP

## 2023-08-31 ASSESSMENT — PAIN DESCRIPTION - LOCATION
LOCATION: BACK
LOCATION: BACK
LOCATION: BACK;HIP
LOCATION: BACK

## 2023-08-31 ASSESSMENT — PAIN DESCRIPTION - PAIN TYPE
TYPE: CHRONIC PAIN

## 2023-08-31 ASSESSMENT — PAIN DESCRIPTION - FREQUENCY
FREQUENCY: CONTINUOUS

## 2023-08-31 ASSESSMENT — PAIN DESCRIPTION - ORIENTATION
ORIENTATION: MID
ORIENTATION: RIGHT
ORIENTATION: OTHER (COMMENT)

## 2023-08-31 ASSESSMENT — PAIN - FUNCTIONAL ASSESSMENT
PAIN_FUNCTIONAL_ASSESSMENT: ACTIVITIES ARE NOT PREVENTED

## 2023-08-31 NOTE — PLAN OF CARE
Problem: Discharge Planning  Goal: Discharge to home or other facility with appropriate resources  8/31/2023 1258 by Galen Pantoja RN  Outcome: Adequate for Discharge  3/59/6688 9046 by TAZ WRIGHT  Outcome: Progressing  Flowsheets  Taken 2/20/7558 0050 by KYLE, [facility]  Discharge to home or other facility with appropriate resources: Identify barriers to discharge with patient and caregiver  Taken 8/31/2023 0005 by Scott Hyatt RN  Discharge to home or other facility with appropriate resources: Identify barriers to discharge with patient and caregiver     Problem: Pain  Goal: Verbalizes/displays adequate comfort level or baseline comfort level  8/31/2023 1258 by Galen Pantoja RN  Outcome: Adequate for Discharge  1/38/2792 8907 by TAZ WRIGHT  Outcome: Progressing  Flowsheets (Taken 8/31/2023 0845)  Verbalizes/displays adequate comfort level or baseline comfort level: Encourage patient to monitor pain and request assistance     Problem: Chronic Conditions and Co-morbidities  Goal: Patient's chronic conditions and co-morbidity symptoms are monitored and maintained or improved  Outcome: Adequate for Discharge

## 2023-08-31 NOTE — CARE COORDINATION
Case Management Assessment  Initial Evaluation    Date/Time of Evaluation: 8/31/2023 10:47 AM  Assessment Completed by: Judith Newton RN    If patient is discharged prior to next notation, then this note serves as note for discharge by case management. Patient Name: Rock Morillo                   YOB: 1940  Diagnosis: Stroke-like symptoms [R29.90]  Cerebrovascular accident (CVA), unspecified mechanism ([address]) [I63.9]                   Date / Time: 8/30/2023  9:04 AM    Patient Admission Status: Inpatient   Readmission Risk (Low < 19, Mod (19-27), High > 27): Readmission Risk Score: 8.1    Current PCP: ERIC Cortes CNP  PCP verified by CM? (P) Yes    Chart Reviewed: Yes      History Provided by: (P) Patient  Patient Orientation: (P) Alert and Oriented, Person, Place, Situation    Patient Cognition: (P) Alert    Hospitalization in the last 30 days (Readmission):  No    If yes, Readmission Assessment in CM Navigator will be completed. Advance Directives:      Code Status: Full Code   Patient's Primary Decision Maker is: (P) Legal Next of Kin    Primary Decision MakerSrobinson Gareth [phone]       08/31/23 1044   Service Assessment   Patient Orientation Alert and Oriented;Person;Place;Situation   Cognition Alert   History Provided By Patient   Primary Caregiver Self   Accompanied By/Relationship self   Support Systems Spouse/Significant Other   Patient's Healthcare Decision Maker is: Legal Next of Kin   PCP Verified by CM Yes   Last Visit to PCP Within last year   Prior Functional Level Independent in ADLs/IADLs   Current Functional Level Independent in ADLs/IADLs   Can patient return to prior living arrangement Yes   Ability to make needs known: Good   Family able to assist with home care needs: Yes   Would you like for me to discuss the discharge plan with any other family members/significant others, and if so, who?  No   Financial Resources [employer]

## 2023-08-31 NOTE — DISCHARGE INSTRUCTIONS
Check your blood pressures at home twice daily every day after at last 5 minutes at rest with feet on the ground and back supported. Bring your cuff to the doctors office to compare. Call your primary care or cardiologist tomorrow to make an appointment for hospital follow-up. Preventing Stroke or TIA: We will increase your blood thinners and your statin medication as below. Monitor BPs twice daily or as directed by primary doctor  -For Back pain we increased Lyrica - make sure it is not making you too sleepy or affecting your mentation before you go back to cycling  -Go for a blood pressure check with your PCP before you go back to cycling  -Monitor sodium intake  -Continue your Losartan 100 mg and your hydrochlorothiazide 25 mg  -Take Aspirin and Plavix for the next 20 days, then stop aspirin but stay on the Plavix indefinitely  -Avoid NSAIDs  -Stop your statin we will start a higher dose statin Lipitor 80 mg  -Stop fish oil for now because it is also a blood thinner and you are already on aspirin and plavix.    -Follow-up with primary care for diabetes check

## 2023-08-31 NOTE — CARE COORDINATION
DISCHARGE ORDER  Date/Time 2023 12:52 PM  Completed by: Miroslava Khan RN, Case Management    Patient Name: Ricky Gayle      : 1940  Admitting Diagnosis: Stroke-like symptoms [R29.90]  Cerebrovascular accident (CVA), unspecified mechanism ([address]) [I63.9]      Admit order Date and Status: IP 2023  (verify MD's last order for status of admission)      Noted discharge order. If applicable PT/OT recommendation at Discharge: [facility]    Discharge Recommendations: Home with PRN assist  DME needs for discharge: Needs Met      Discharge Plan: Chart reviewed. Met with pt at bedside and explained the role of the CM. IPTA. Ambulatory. DC home today. No DC needs or barriers. Date of Last IMM Given: 2023 (first copy)    Has Home O2 in place on admit:  No  Informed of need to bring portable home O2 tank on day of discharge for nursing to connect prior to leaving:   No  Verbalized agreement/Understanding:   No  Pt is being d/c'd to home today. Pt's O2 sats are 98% on RA. Discharge timeout done with Diego Phillips RN. All discharge needs and concerns addressed.

## 2023-08-31 NOTE — DISCHARGE SUMMARY
Hospital Medicine Discharge Summary    Patient: Nitin Ye     Gender: male  : 1940   Age: 80 y.o. MRN: [id number]    Admitting Physician: Suman Talbot MD  Discharge Physician: Jennifer Dennis DO    Code Status: Full Code     Admit Date: 2023   Discharge Date:  2023     Discharge Diagnoses: Active Hospital Problems    Diagnosis Date Noted    Stroke-like symptoms [R29.90] 2023    Hypertension [I10] 2023    Hyperlipidemia [E78.5] 2023    Pre-diabetes [R73.03] 2023       Condition at Discharge: Stable    Hospital Course: The patient is a 80 y.o. male with PMHx HTN, HLD, pre-diabetes, hx DVT who presented to [facility] ED with complaint of acute on chronic back pain. Patient reports waking abruptly in the middle of the night with severe mid-posterior rib pain. He has chronic back pain but reports the nature of his symptoms was different, sharp and tearing. At that time, he felt dizzy when standing and had difficulty with word finding. Knew what he wanted to say but could not produce words. His last known well is approximately 2300 on . These symptoms subsided by 9-10 AM this morning. He denied any gait disturbance or motor weakness. No recent illness. Denies fever, chills, headache, n/v/d, abdominal pain, chest pain, cough or SOB. He was noted to have slight left sided facial droop in the ER, but patient believes this is how he has always looked. TIA thought due to small vessel disease  Neurology consulted  MRI no stroke  HTN much better, down to 130s at times, he says he is usually well controlled  Continue Losartan 100 mg and your hydrochlorothiazide 25 mg  Home BP monitoring  Added Plavix. DAPT 21 days then switch to Plavix only, switch statin to Lipitor. Recent echo and LE dopplers reviewed. Unremarkable findings.    Discussed sodium intake and monitoring   Lyrica increased for back pain, asked pt to monitor for tolerance  Follow-up with primary care for repeat

## 2023-08-31 NOTE — PROGRESS NOTES
Inpatient Occupational Therapy Evaluation and Treatment    Unit: 2 [place]  Date:  8/31/2023  Patient Name:    Ruby Romero  Admitting diagnosis:  Stroke-like symptoms [R29.90]  Cerebrovascular accident (CVA), unspecified mechanism ([address]) [I63.9]  Admit Date:  8/30/2023  Precautions/Restrictions/WB Status/ Lines/ Wounds/ Oxygen: Fall risk, Lines (IV), and Telemetry      Treatment Time:  10:32-11:10  Treatment Number:  1  Timed Code Treatment Minutes: 28 minutes  Total Treatment Minutes:  38  minutes    Patient Goals for Therapy: \"to go home today \"          Discharge Recommendations: Home with PRN assist  DME needs for discharge: Needs Met       Therapy recommendations for staff: Independent for transfers with use of No AD within room    History of Present Illness: Per Dr. Kenya Evans neuro consult 8/31/23: This is an 80years old right-handed male. The patient was brought to the ER yesterday due to new onset of speech difficulty. The patient developed acute onset of severe rib pain during sleep 2 days ago. Then the patient woke up at 7 AM yesterday and developed dizziness with speech difficulty. The symptom was gradually subsided few hours later. Upon admission, the patient was found significantly elevated blood pressure at 200/100. The patient denies history of CVA or seizure disorder. But the patient has history of spinal stenosis. 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 CTA of head and neck showed no significant hemodynamic stenosis. EKG showed normal sinus rhythm with PAC. From neurology perspective, this is TIA in the context of hypertensive emergency. Home Health S4 Level Recommendation:  NA    AM-PAC Score: AM-PAC Inpatient Daily Activity Raw Score: 21     Subjective:  Patient lying reclined in bed with no family present. Pt agreeable to this OT session.      Cognition:    A&O x4   Able to Not Tested  LE:    Supervision to thread LES into clothing, don/doff socks    Bathing:    UE:  Not Tested  LE:  Not Tested    Eating:   Not Tested    Toileting:  Not Tested (pt reports he is continent and has been taking himself to the bathroom)    Grooming/hygiene: Independent    Activity Tolerance:   Pt completed therapy session with no adverse symptoms     BP (mmHg) HR (bpm) SpO2 (%) on RA Comments   Supine at rest       Seated at EOB       Standing 141/77 61 98%    End of session 157/92        Positioning Needs:   Pt up in chair, no alarm needed, call light provided and all needs within reach . Ther Ex / Activities Initiated:   N/A    Patient/Family Education:   Pt educated on role of inpatient OT, plan of care, importance of continued activity, DC recommendations and Safety awareness. Assessment:  Pt seen for Occupational therapy evaluation in acute care setting. Pt is functioning close to baseline and no further OT services are warranted at this time.     Recommending Home PRN assist upon discharge as patient functioning close to baseline level        Electronically signed by Konstantin Saeed OT on 8/31/2023 at 11:22 AM      If patient discharges from this facility prior to next visit, this note will serve as the Discharge Summary

## 2023-08-31 NOTE — CARE COORDINATION
IMM delivered with verbal explanation of patient rights at bedside. Pt voiced understanding of discharge MCR rights.      08/31/23 1041   IMM Letter   IMM Letter given to Patient/Family/Significant other/Guardian/POA/by: Edgar Ramirez RN   IMM Letter date given: 08/31/23   IMM Letter time given: 9841

## 2023-08-31 NOTE — PLAN OF CARE
Problem: Discharge Planning  Goal: Discharge to home or other facility with appropriate resources  Outcome: Progressing  Flowsheets  Taken 6/07/1347 4725 by TAZ WRIGHT  Discharge to home or other facility with appropriate resources: Identify barriers to discharge with patient and caregiver  Taken 8/31/2023 0005 by Amie Gonzales RN  Discharge to home or other facility with appropriate resources: Identify barriers to discharge with patient and caregiver     Problem: Pain  Goal: Verbalizes/displays adequate comfort level or baseline comfort level  Outcome: Progressing  Flowsheets (Taken 8/31/2023 0845)  Verbalizes/displays adequate comfort level or baseline comfort level: Encourage patient to monitor pain and request assistance

## 2023-08-31 NOTE — PROGRESS NOTES
Pt declined transportation by wheelchair. Pt leaving via ambulating self to car with wife to home. Discharge instructions given. Pt voiced understanding. Copy of discharge and scripts with patient. Iv removed. CP and PE completed. No further needs at discharge. Pt leaving with all personal belonging.

## 2023-08-31 NOTE — PROGRESS NOTES
Pt requested something for pain in his back. Rn perfect served provider and ordered Q6 tylenol. Pt sleeping when Rn returned for pain reassessment.

## 2023-08-31 NOTE — ACP (ADVANCE CARE PLANNING)
Advance Care Planning   Healthcare Decision Maker:    Primary Decision Maker: Karen Harper - [phone]    Click here to complete Healthcare Decision Makers including selection of the Healthcare Decision Maker Relationship (ie \"Primary\"). Today we documented Decision Maker(s) consistent with Legal Next of Kin hierarchy.

## 2023-09-01 LAB
EST. AVERAGE GLUCOSE BLD GHB EST-MCNC: 139.9 MG/DL
HBA1C MFR BLD: 6.5 %

## 2023-09-19 ENCOUNTER — HOSPITAL ENCOUNTER (OUTPATIENT)
Dept: GENERAL RADIOLOGY | Age: 83
Discharge: HOME | End: 2023-09-19
Payer: MEDICARE

## 2023-09-19 ENCOUNTER — HOSPITAL ENCOUNTER (OUTPATIENT)
Age: 83
Discharge: HOME | End: 2023-09-19
Payer: MEDICARE

## 2023-09-19 DIAGNOSIS — R07.81 RIB PAIN: ICD-10-CM

## 2023-09-19 PROCEDURE — 71110 X-RAY EXAM RIBS BIL 3 VIEWS: CPT

## 2023-09-25 RX ORDER — CLOPIDOGREL BISULFATE 75 MG/1
75 TABLET ORAL DAILY
Qty: 30 TABLET | Refills: 0 | OUTPATIENT
Start: 2023-09-25

## 2023-09-25 RX ORDER — ATORVASTATIN CALCIUM 80 MG/1
80 TABLET, FILM COATED ORAL DAILY
Qty: 30 TABLET | Refills: 0 | OUTPATIENT
Start: 2023-09-25

## 2023-09-26 ENCOUNTER — HOSPITAL ENCOUNTER (OUTPATIENT)
Dept: MRI IMAGING | Age: 83
Discharge: HOME | End: 2023-09-26
Payer: MEDICARE

## 2023-09-26 DIAGNOSIS — M51.34 DEGENERATION OF THORACIC INTERVERTEBRAL DISC: ICD-10-CM

## 2023-09-26 PROCEDURE — 72146 MRI CHEST SPINE W/O DYE: CPT

## 2023-10-03 RX ORDER — ATORVASTATIN CALCIUM 80 MG/1
80 TABLET, FILM COATED ORAL DAILY
Qty: 30 TABLET | Refills: 0 | OUTPATIENT
Start: 2023-10-03

## 2023-10-09 NOTE — DISCHARGE INSTRUCTIONS
THE OFFICE WILL CALL YOU TO SCHEDULE THE NEXT FOLLOW UP APPOINTMENT    RESUME USUAL DIET    RESUME USUAL ACTIVITY    RESUME USUAL MEDICATIONS    IF ANY PROBLEMS DEVELOP SUCH AS BLEEDING, LOSS OF VISION OR PAIN THAT IS NOT CONTROLLED WITH TYLENOL OR ADVIL     CALL DR. Steven Moreland  AT  [phone]

## 2023-10-11 ENCOUNTER — HOSPITAL ENCOUNTER (OUTPATIENT)
Dept: ENDOSCOPY | Age: 83
Setting detail: OUTPATIENT SURGERY
Discharge: HOME | End: 2023-10-11
Payer: MEDICARE

## 2023-10-11 VITALS
TEMPERATURE: 97.2 F | DIASTOLIC BLOOD PRESSURE: 78 MMHG | WEIGHT: 195 LBS | BODY MASS INDEX: 27.92 KG/M2 | HEIGHT: 70 IN | HEART RATE: 55 BPM | RESPIRATION RATE: 16 BRPM | SYSTOLIC BLOOD PRESSURE: 142 MMHG

## 2023-10-11 PROCEDURE — 66821 AFTER CATARACT LASER SURGERY: CPT

## 2023-10-11 PROCEDURE — 6370000000 HC RX 637 (ALT 250 FOR IP): Performed by: OPHTHALMOLOGY

## 2023-10-11 PROCEDURE — 2500000003 HC RX 250 WO HCPCS: Performed by: OPHTHALMOLOGY

## 2023-10-11 RX ORDER — SOFT LENS RINSE,STORE SOLUTION
SOLUTION, NON-ORAL MISCELLANEOUS ONCE
Status: COMPLETED | OUTPATIENT
Start: 2023-10-11 | End: 2023-10-11

## 2023-10-11 RX ORDER — PROPARACAINE HYDROCHLORIDE 5 MG/ML
1 SOLUTION/ DROPS OPHTHALMIC ONCE
Status: COMPLETED | OUTPATIENT
Start: 2023-10-11 | End: 2023-10-11

## 2023-10-11 RX ORDER — APRACLONIDINE HYDROCHLORIDE 5 MG/ML
1 SOLUTION/ DROPS OPHTHALMIC 2 TIMES DAILY PRN
Status: DISCONTINUED | OUTPATIENT
Start: 2023-10-11 | End: 2023-10-12 | Stop reason: HOSPADM

## 2023-10-11 RX ORDER — PHENYLEPHRINE HYDROCHLORIDE 25 MG/ML
1 SOLUTION/ DROPS OPHTHALMIC ONCE
Status: COMPLETED | OUTPATIENT
Start: 2023-10-11 | End: 2023-10-11

## 2023-10-11 RX ORDER — TROPICAMIDE 10 MG/ML
1 SOLUTION/ DROPS OPHTHALMIC ONCE
Status: COMPLETED | OUTPATIENT
Start: 2023-10-11 | End: 2023-10-11

## 2023-10-11 RX ADMIN — APRACLONIDINE 1 DROP: 5.75 SOLUTION OPHTHALMIC at 12:19

## 2023-10-11 RX ADMIN — TROPICAMIDE 1 DROP: 10 SOLUTION/ DROPS OPHTHALMIC at 11:41

## 2023-10-11 RX ADMIN — APRACLONIDINE 1 DROP: 5.75 SOLUTION OPHTHALMIC at 11:45

## 2023-10-11 RX ADMIN — Medication: at 12:17

## 2023-10-11 RX ADMIN — PHENYLEPHRINE HYDROCHLORIDE 1 DROP: 25 SOLUTION/ DROPS OPHTHALMIC at 11:43

## 2023-10-11 RX ADMIN — PROPARACAINE HYDROCHLORIDE 1 DROP: 5 SOLUTION/ DROPS OPHTHALMIC at 12:08

## 2023-10-11 ASSESSMENT — PAIN SCALES - GENERAL
PAINLEVEL_OUTOF10: 0
PAINLEVEL_OUTOF10: 0

## 2023-10-11 NOTE — OP NOTE
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 Dilation drops were then applied to induce adequate mydriasis. The patient was then taken to the laser room and  positioned in front of the YAG laser. Topical Proparacaine drops were then applied. The patient then received 42 applications of 1.8 millijoules to the densely opacified posterior capsule. Once this was accomplished, a nice capsulotomy was performed. The eye was then rinsed using sterile saline. Iopidine 0.5% was then applied. The patient's pressure will be measured closely and conservatively as an outpatient in my office. Patient tolerated the procedure well without any complications.        Patient: Aliya Davies  YOB: 1940  MRN: [id number]    Date of Procedure: 10/11/23    Electronically signed by Zan Nam MD on 10/11/2023 at 12:12 PM

## 2023-10-11 NOTE — PROGRESS NOTES
Pt here for laser eye procedure with Dr. Cyndi Villatoro  Procedure explained to pt and consent obtained  Laser eye procedure completed  See Dr. Toro Marshall procedural note for details  Pt fatuma well  No c/o's voiced   Discharge instructions reviewed with pt and copy given  Understanding verbalized  Pt discharged to home in stable condition

## 2023-10-24 NOTE — DISCHARGE INSTRUCTIONS
THE OFFICE WILL CALL YOU TO SCHEDULE THE NEXT FOLLOW UP APPOINTMENT    RESUME USUAL DIET    RESUME USUAL ACTIVITY    RESUME USUAL MEDICATIONS    IF ANY PROBLEMS DEVELOP SUCH AS BLEEDING, LOSS OF VISION OR PAIN THAT IS NOT CONTROLLED WITH TYLENOL OR ADVIL     CALL DR. Steven Moreland  AT  [phone]

## 2023-10-25 ENCOUNTER — HOSPITAL ENCOUNTER (OUTPATIENT)
Dept: ENDOSCOPY | Age: 83
Setting detail: OUTPATIENT SURGERY
Discharge: HOME | End: 2023-10-25
Admitting: OPHTHALMOLOGY
Payer: MEDICARE

## 2023-10-25 VITALS
SYSTOLIC BLOOD PRESSURE: 126 MMHG | WEIGHT: 195 LBS | RESPIRATION RATE: 16 BRPM | HEIGHT: 70 IN | BODY MASS INDEX: 27.92 KG/M2 | DIASTOLIC BLOOD PRESSURE: 72 MMHG | HEART RATE: 64 BPM

## 2023-10-25 PROCEDURE — 2500000003 HC RX 250 WO HCPCS: Performed by: OPHTHALMOLOGY

## 2023-10-25 PROCEDURE — 66821 AFTER CATARACT LASER SURGERY: CPT

## 2023-10-25 PROCEDURE — 6370000000 HC RX 637 (ALT 250 FOR IP): Performed by: OPHTHALMOLOGY

## 2023-10-25 RX ORDER — SOFT LENS RINSE,STORE SOLUTION
SOLUTION, NON-ORAL MISCELLANEOUS ONCE
Status: COMPLETED | OUTPATIENT
Start: 2023-10-25 | End: 2023-10-25

## 2023-10-25 RX ORDER — PROPARACAINE HYDROCHLORIDE 5 MG/ML
1 SOLUTION/ DROPS OPHTHALMIC ONCE
Status: COMPLETED | OUTPATIENT
Start: 2023-10-25 | End: 2023-10-25

## 2023-10-25 RX ORDER — PHENYLEPHRINE HYDROCHLORIDE 25 MG/ML
1 SOLUTION/ DROPS OPHTHALMIC ONCE
Status: COMPLETED | OUTPATIENT
Start: 2023-10-25 | End: 2023-10-25

## 2023-10-25 RX ORDER — TROPICAMIDE 10 MG/ML
1 SOLUTION/ DROPS OPHTHALMIC ONCE
Status: COMPLETED | OUTPATIENT
Start: 2023-10-25 | End: 2023-10-25

## 2023-10-25 RX ORDER — APRACLONIDINE HYDROCHLORIDE 5 MG/ML
1 SOLUTION/ DROPS OPHTHALMIC 2 TIMES DAILY PRN
Status: COMPLETED | OUTPATIENT
Start: 2023-10-25 | End: 2023-10-25

## 2023-10-25 RX ADMIN — APRACLONIDINE 1 DROP: 5.75 SOLUTION OPHTHALMIC at 11:31

## 2023-10-25 RX ADMIN — PROPARACAINE HYDROCHLORIDE 1 DROP: 5 SOLUTION/ DROPS OPHTHALMIC at 12:00

## 2023-10-25 RX ADMIN — WATER, PURIFIED: 99.05 LIQUID OPHTHALMIC at 12:10

## 2023-10-25 RX ADMIN — TROPICAMIDE 1 DROP: 10 SOLUTION/ DROPS OPHTHALMIC at 11:24

## 2023-10-25 RX ADMIN — APRACLONIDINE 1 DROP: 5.75 SOLUTION OPHTHALMIC at 12:14

## 2023-10-25 RX ADMIN — PHENYLEPHRINE HYDROCHLORIDE 1 DROP: 25 SOLUTION/ DROPS OPHTHALMIC at 11:27

## 2023-10-25 ASSESSMENT — PAIN SCALES - GENERAL
PAINLEVEL_OUTOF10: 0
PAINLEVEL_OUTOF10: 0

## 2023-10-25 NOTE — PROGRESS NOTES
Pt here for laser eye procedure with Dr. Bebe Thurman  Procedure explained to pt and consent obtained  Laser eye procedure completed  See Dr. Su Ortega procedural note for details  Pt fatuma well  No c/o's voiced   Discharge instructions reviewed with pt and copy given  Pt discharged to home in stable condition

## 2023-10-25 NOTE — OP NOTE
Haroldo Mullins    Pre-operative diagnosis:  Opacified posterior capsule of the the right eye    Post-operative diagnosis:  Same    Procedure Performed:  YAG laser capsulotomy the right eye                                      Anesthesia:  Topical anesthesia     Complications:  None    Procedure:  Informed consent was obtained from the patient. Dilation drops were then applied to induce adequate mydriasis. The patient was then taken to the laser room and  positioned in front of the YAG laser. Topical Proparacaine drops were then applied. The patient then received 51 applications of 2.0 millijoules to the densely opacified posterior capsule. Once this was accomplished, a nice capsulotomy was performed. The eye was then rinsed using sterile saline. Iopidine 0.5% was then applied. The patient's pressure will be measured closely and conservatively as an outpatient in my office. Patient tolerated the procedure well without any complications.        Patient: Haroldo Mullins  YOB: 1940  MRN: [id number]    Date of Procedure: 10/25/23    Electronically signed by Hakeem Logan MD on 10/25/2023 at 11:49 AM

## 2023-10-30 DIAGNOSIS — M79.2 NEUROPATHIC PAIN: ICD-10-CM

## 2023-10-30 RX ORDER — PREGABALIN 50 MG/1
50 CAPSULE ORAL 2 TIMES DAILY
Qty: 60 CAPSULE | Refills: 0 | OUTPATIENT
Start: 2023-10-30

## 2024-01-18 ENCOUNTER — HOSPITAL ENCOUNTER (EMERGENCY)
Age: 84
Discharge: HOME | End: 2024-01-18
Attending: EMERGENCY MEDICINE
Payer: MEDICARE

## 2024-01-18 ENCOUNTER — APPOINTMENT (OUTPATIENT)
Dept: CT IMAGING | Age: 84
End: 2024-01-18
Payer: MEDICARE

## 2024-01-18 ENCOUNTER — APPOINTMENT (OUTPATIENT)
Dept: GENERAL RADIOLOGY | Age: 84
End: 2024-01-18
Payer: MEDICARE

## 2024-01-18 VITALS
HEART RATE: 75 BPM | RESPIRATION RATE: 16 BRPM | DIASTOLIC BLOOD PRESSURE: 98 MMHG | SYSTOLIC BLOOD PRESSURE: 145 MMHG | OXYGEN SATURATION: 97 % | TEMPERATURE: 97.7 F

## 2024-01-18 DIAGNOSIS — R55 SYNCOPE, UNSPECIFIED SYNCOPE TYPE: ICD-10-CM

## 2024-01-18 DIAGNOSIS — R55 NEAR SYNCOPE: Primary | ICD-10-CM

## 2024-01-18 LAB
AMMONIA PLAS-SCNC: <10 UMOL/L (ref 16–60)
ANION GAP SERPL CALCULATED.3IONS-SCNC: 11 MMOL/L (ref 3–16)
BASE EXCESS BLDV CALC-SCNC: 1.6 MMOL/L (ref -3–3)
BASOPHILS # BLD: 0 K/UL (ref 0–0.2)
BASOPHILS NFR BLD: 0.4 %
BILIRUB UR QL STRIP.AUTO: NEGATIVE
BUN SERPL-MCNC: 19 MG/DL (ref 7–20)
CALCIUM SERPL-MCNC: 9.8 MG/DL (ref 8.3–10.6)
CHLORIDE SERPL-SCNC: 104 MMOL/L (ref 99–110)
CLARITY UR: CLEAR
CO2 BLDV-SCNC: 27 MMOL/L
CO2 SERPL-SCNC: 27 MMOL/L (ref 21–32)
COHGB MFR BLDV: 1.4 % (ref 0–1.5)
COLOR UR: YELLOW
CREAT SERPL-MCNC: 1 MG/DL (ref 0.8–1.3)
D DIMER: 2.52 UG/ML FEU (ref 0–0.6)
DEPRECATED RDW RBC AUTO: 13.8 % (ref 12.4–15.4)
EKG ATRIAL RATE: 69 BPM
EKG DIAGNOSIS: NORMAL
EKG P AXIS: 33 DEGREES
EKG P-R INTERVAL: 174 MS
EKG Q-T INTERVAL: 418 MS
EKG QRS DURATION: 114 MS
EKG QTC CALCULATION (BAZETT): 447 MS
EKG R AXIS: -43 DEGREES
EKG T AXIS: 18 DEGREES
EKG VENTRICULAR RATE: 69 BPM
EOSINOPHIL # BLD: 0.1 K/UL (ref 0–0.6)
EOSINOPHIL NFR BLD: 0.7 %
GFR SERPLBLD CREATININE-BSD FMLA CKD-EPI: >60 ML/MIN/{1.73_M2}
GLUCOSE BLD-MCNC: 137 MG/DL (ref 70–99)
GLUCOSE SERPL-MCNC: 145 MG/DL (ref 70–99)
GLUCOSE UR STRIP.AUTO-MCNC: NEGATIVE MG/DL
HCO3 BLDV-SCNC: 25.9 MMOL/L (ref 23–29)
HCT VFR BLD AUTO: 35.9 % (ref 40.5–52.5)
HGB BLD-MCNC: 12.4 G/DL (ref 13.5–17.5)
HGB UR QL STRIP.AUTO: NEGATIVE
KETONES UR STRIP.AUTO-MCNC: NEGATIVE MG/DL
LEUKOCYTE ESTERASE UR QL STRIP.AUTO: NEGATIVE
LYMPHOCYTES # BLD: 2.6 K/UL (ref 1–5.1)
LYMPHOCYTES NFR BLD: 23.9 %
MCH RBC QN AUTO: 34 PG (ref 26–34)
MCHC RBC AUTO-ENTMCNC: 34.5 G/DL (ref 31–36)
MCV RBC AUTO: 98.6 FL (ref 80–100)
METHGB MFR BLDV: 0.3 %
MONOCYTES # BLD: 0.6 K/UL (ref 0–1.3)
MONOCYTES NFR BLD: 5.1 %
NEUTROPHILS # BLD: 7.6 K/UL (ref 1.7–7.7)
NEUTROPHILS NFR BLD: 69.9 %
NITRITE UR QL STRIP.AUTO: NEGATIVE
O2 THERAPY: ABNORMAL
PCO2 BLDV: 39.8 MMHG (ref 40–50)
PERFORMED ON: ABNORMAL
PH BLDV: 7.43 [PH] (ref 7.35–7.45)
PH UR STRIP.AUTO: 7 [PH] (ref 5–8)
PLATELET # BLD AUTO: 257 K/UL (ref 135–450)
PMV BLD AUTO: 8.8 FL (ref 5–10.5)
PO2 BLDV: 24.7 MMHG (ref 25–40)
POTASSIUM SERPL-SCNC: 3.8 MMOL/L (ref 3.5–5.1)
PROT UR STRIP.AUTO-MCNC: NEGATIVE MG/DL
RBC # BLD AUTO: 3.64 M/UL (ref 4.2–5.9)
SAO2 % BLDV: 46 %
SODIUM SERPL-SCNC: 142 MMOL/L (ref 136–145)
SP GR UR STRIP.AUTO: 1.01 (ref 1–1.03)
TROPONIN, HIGH SENSITIVITY: 21 NG/L (ref 0–22)
TROPONIN, HIGH SENSITIVITY: 23 NG/L (ref 0–22)
UA COMPLETE W REFLEX CULTURE PNL UR: NORMAL
UA DIPSTICK W REFLEX MICRO PNL UR: NORMAL
URN SPEC COLLECT METH UR: NORMAL
UROBILINOGEN UR STRIP-ACNC: 0.2 E.U./DL
WBC # BLD AUTO: 10.8 K/UL (ref 4–11)

## 2024-01-18 PROCEDURE — 81003 URINALYSIS AUTO W/O SCOPE: CPT

## 2024-01-18 PROCEDURE — 82803 BLOOD GASES ANY COMBINATION: CPT

## 2024-01-18 PROCEDURE — 70450 CT HEAD/BRAIN W/O DYE: CPT

## 2024-01-18 PROCEDURE — 82140 ASSAY OF AMMONIA: CPT

## 2024-01-18 PROCEDURE — 93005 ELECTROCARDIOGRAM TRACING: CPT | Performed by: EMERGENCY MEDICINE

## 2024-01-18 PROCEDURE — 71260 CT THORAX DX C+: CPT

## 2024-01-18 PROCEDURE — 85025 COMPLETE CBC W/AUTO DIFF WBC: CPT

## 2024-01-18 PROCEDURE — 80048 BASIC METABOLIC PNL TOTAL CA: CPT

## 2024-01-18 PROCEDURE — 36415 COLL VENOUS BLD VENIPUNCTURE: CPT

## 2024-01-18 PROCEDURE — 93010 ELECTROCARDIOGRAM REPORT: CPT | Performed by: INTERNAL MEDICINE

## 2024-01-18 PROCEDURE — 84484 ASSAY OF TROPONIN QUANT: CPT

## 2024-01-18 PROCEDURE — 6360000004 HC RX CONTRAST MEDICATION: Performed by: EMERGENCY MEDICINE

## 2024-01-18 PROCEDURE — 99285 EMERGENCY DEPT VISIT HI MDM: CPT

## 2024-01-18 PROCEDURE — 85379 FIBRIN DEGRADATION QUANT: CPT

## 2024-01-18 PROCEDURE — 70496 CT ANGIOGRAPHY HEAD: CPT

## 2024-01-18 RX ADMIN — IOMEPROL INJECTION 150 ML: 714 INJECTION, SOLUTION INTRAVASCULAR at 12:35

## 2024-01-18 NOTE — DISCHARGE INSTRUCTIONS
As discussed the blood work and imaging was normal.  Please reach out to primary care office to discuss getting a echocardiogram ordered for further evaluation of your symptoms.  If you have worsening of your symptoms, please come back to the hospital.

## 2024-02-16 ENCOUNTER — HOSPITAL ENCOUNTER (EMERGENCY)
Age: 84
Discharge: HOME | End: 2024-02-16
Attending: EMERGENCY MEDICINE
Payer: MEDICARE

## 2024-02-16 ENCOUNTER — APPOINTMENT (OUTPATIENT)
Dept: GENERAL RADIOLOGY | Age: 84
End: 2024-02-16
Payer: MEDICARE

## 2024-02-16 VITALS
WEIGHT: 190 LBS | SYSTOLIC BLOOD PRESSURE: 134 MMHG | DIASTOLIC BLOOD PRESSURE: 74 MMHG | HEIGHT: 70 IN | BODY MASS INDEX: 27.2 KG/M2 | HEART RATE: 94 BPM | RESPIRATION RATE: 16 BRPM | OXYGEN SATURATION: 100 % | TEMPERATURE: 98.7 F

## 2024-02-16 DIAGNOSIS — J20.9 SUBACUTE BRONCHITIS: Primary | ICD-10-CM

## 2024-02-16 DIAGNOSIS — R05.2 SUBACUTE COUGH: ICD-10-CM

## 2024-02-16 LAB
FLUAV RNA UPPER RESP QL NAA+PROBE: NEGATIVE
FLUBV AG NPH QL: NEGATIVE
SARS-COV-2 RDRP RESP QL NAA+PROBE: NOT DETECTED

## 2024-02-16 PROCEDURE — 87804 INFLUENZA ASSAY W/OPTIC: CPT

## 2024-02-16 PROCEDURE — 71045 X-RAY EXAM CHEST 1 VIEW: CPT

## 2024-02-16 PROCEDURE — 87635 SARS-COV-2 COVID-19 AMP PRB: CPT

## 2024-02-16 PROCEDURE — 99284 EMERGENCY DEPT VISIT MOD MDM: CPT

## 2024-02-16 RX ORDER — PREDNISONE 20 MG/1
20 TABLET ORAL DAILY
Qty: 5 TABLET | Refills: 0 | Status: SHIPPED | OUTPATIENT
Start: 2024-02-16 | End: 2024-02-21

## 2024-02-16 RX ORDER — BENZONATATE 100 MG/1
100 CAPSULE ORAL 2 TIMES DAILY PRN
Qty: 20 CAPSULE | Refills: 0 | Status: SHIPPED | OUTPATIENT
Start: 2024-02-16 | End: 2024-02-23

## 2024-02-16 RX ORDER — DOXYCYCLINE HYCLATE 100 MG
100 TABLET ORAL 2 TIMES DAILY
Qty: 14 TABLET | Refills: 0 | Status: SHIPPED | OUTPATIENT
Start: 2024-02-16 | End: 2024-02-23

## 2024-02-16 ASSESSMENT — PAIN - FUNCTIONAL ASSESSMENT: PAIN_FUNCTIONAL_ASSESSMENT: 0-10

## 2024-02-16 ASSESSMENT — PAIN DESCRIPTION - DESCRIPTORS: DESCRIPTORS: DULL

## 2024-02-16 ASSESSMENT — PAIN DESCRIPTION - PAIN TYPE: TYPE: ACUTE PAIN

## 2024-02-16 ASSESSMENT — PAIN DESCRIPTION - LOCATION: LOCATION: HIP

## 2024-02-16 ASSESSMENT — PAIN DESCRIPTION - ORIENTATION: ORIENTATION: RIGHT

## 2024-02-16 ASSESSMENT — PAIN SCALES - GENERAL: PAINLEVEL_OUTOF10: 3

## 2024-02-16 NOTE — ED PROVIDER NOTES
[facility] EMERGENCY DEPARTMENT  EMERGENCY DEPARTMENT ENCOUNTER        Pt Name: Antonio Mccracken  MRN: [id number]  Birthdate 1940  Date of evaluation: 2/16/2024  Provider: Jillian Dumont DO  PCP: Casandra Rodriguez APRN - NP  Note Started: 4:26 PM EST 2/16/24    CHIEF COMPLAINT      Cough, Congestion    HISTORY OF PRESENT ILLNESS: 1 or more Elements     Chief Complaint   Patient presents with    URI     Upper respiratory symptoms x3 weeks, has been seen and started on atb and steroids, states improved for a little and now worse again     History from : Patient  Limitations to history : None    Antonio Mccracken is a 83 y.o. male who presents to the emergency department secondary to concern for cough and chest congestion.  Reports symptoms have been going on for about 3 weeks.  A week ago, he was started on prednisone and azithromycin.  Reports that today was the last day and his symptoms returned today as well.  Currently is having a cough productive of green sputum.  Denies history of asthma or COPD.    Past medical history noted below. Aside from what is stated above denies any other symptoms or modifying factors.   reports that he has never smoked. He has never used smokeless tobacco. He reports current alcohol use. He reports that he does not use drugs.  Nursing Notes were all reviewed and agreed with or any disagreements addressed in HPI/MDM.  REVIEW OF SYSTEMS :    Review of Systems   Constitutional:  Negative for chills and fever.   HENT:  Positive for congestion. Negative for rhinorrhea.    Eyes:  Negative for pain and redness.   Respiratory:  Positive for cough. Negative for shortness of breath.    Cardiovascular:  Negative for chest pain and leg swelling.   Gastrointestinal:  Negative for abdominal pain, constipation, diarrhea, nausea and vomiting.   Genitourinary:  Negative for frequency and urgency.   Musculoskeletal:  Negative for back pain and neck pain.   Skin:  Negative for rash.   Neurological:  ULTRASOUND:   N/A    LABS:  Labs Reviewed   COVID-19, RAPID   RAPID INFLUENZA A/B ANTIGENS     When ordered only abnormal lab results are displayed. All other labs were within normal range or not returned as of this dictation.  PROCEDURES   Unless otherwise noted below, none  Procedures  CRITICAL CARE TIME (.cct)   Due to the immediate potential for life-threatening deterioration due to n/a, I spent 0 minutes providing critical care. There was a high probability of clinically significant/life threatening deterioration in the patient's condition which required my urgent intervention. This time excludes time spent performing procedures but includes time spent on direct patient care, history retrieval, review of the chart, and discussions with patient, family, and consultant(s).  EMERGENCY DEPARTMENT COURSE and DIFFERENTIAL DIAGNOSIS/MDM:   CONSULTS: (Who and what was discussed)  None  Discussion with Other Profesionals : None  Social Determinants Significantly Affecting Health : None  Chronic Conditions: see medical history  Records Reviewed : None    Patient was given the following medications:  Orders Placed This Encounter   Medications    doxycycline hyclate (VIBRA-TABS) 100 MG tablet     Sig: Take 1 tablet by mouth 2 times daily for 7 days     Dispense:  14 tablet     Refill:  0    predniSONE (DELTASONE) 20 MG tablet     Sig: Take 1 tablet by mouth daily for 5 days     Dispense:  5 tablet     Refill:  0    benzonatate (TESSALON) 100 MG capsule     Sig: Take 1 capsule by mouth 2 times daily as needed for Cough     Dispense:  20 capsule     Refill:  0     INITIAL VITALS: BP: (!) 156/105, Temp: 98.7 °F (37.1 °C), Pulse: 73, Respirations: 16, SpO2: 96 %   RECENT VITALS:  BP: 134/74,Temp: 98.7 °F (37.1 °C), Pulse: 94, Respirations: 16, SpO2: 100 %     Is this patient to be included in the SEP-1 Core Measure due to severe sepsis or septic shock?   No   Exclusion criteria - the patient is NOT to be included for SEP-1

## 2024-02-16 NOTE — ED TRIAGE NOTES
Chief Complaint   Patient presents with    URI     Upper respiratory symptoms x3 weeks, has been seen and started on atb and steroids, states improved for a little and now worse again

## 2024-02-21 ENCOUNTER — HOSPITAL ENCOUNTER (EMERGENCY)
Age: 84
Discharge: HOME | End: 2024-02-21
Attending: EMERGENCY MEDICINE
Payer: MEDICARE

## 2024-02-21 ENCOUNTER — APPOINTMENT (OUTPATIENT)
Dept: GENERAL RADIOLOGY | Age: 84
End: 2024-02-21
Payer: MEDICARE

## 2024-02-21 VITALS
WEIGHT: 197.7 LBS | HEIGHT: 70 IN | TEMPERATURE: 98.3 F | RESPIRATION RATE: 18 BRPM | OXYGEN SATURATION: 94 % | BODY MASS INDEX: 28.3 KG/M2 | HEART RATE: 68 BPM | SYSTOLIC BLOOD PRESSURE: 124 MMHG | DIASTOLIC BLOOD PRESSURE: 72 MMHG

## 2024-02-21 DIAGNOSIS — R05.3 CHRONIC COUGH: Primary | ICD-10-CM

## 2024-02-21 LAB
EKG ATRIAL RATE: 77 BPM
EKG DIAGNOSIS: NORMAL
EKG P AXIS: 49 DEGREES
EKG P-R INTERVAL: 172 MS
EKG Q-T INTERVAL: 402 MS
EKG QRS DURATION: 112 MS
EKG QTC CALCULATION (BAZETT): 454 MS
EKG R AXIS: -13 DEGREES
EKG T AXIS: 22 DEGREES
EKG VENTRICULAR RATE: 77 BPM
FLUAV RNA UPPER RESP QL NAA+PROBE: NEGATIVE
FLUBV AG NPH QL: NEGATIVE
SARS-COV-2 RDRP RESP QL NAA+PROBE: NOT DETECTED

## 2024-02-21 PROCEDURE — 93010 ELECTROCARDIOGRAM REPORT: CPT | Performed by: INTERNAL MEDICINE

## 2024-02-21 PROCEDURE — 87804 INFLUENZA ASSAY W/OPTIC: CPT

## 2024-02-21 PROCEDURE — 87635 SARS-COV-2 COVID-19 AMP PRB: CPT

## 2024-02-21 PROCEDURE — 99285 EMERGENCY DEPT VISIT HI MDM: CPT

## 2024-02-21 PROCEDURE — 93005 ELECTROCARDIOGRAM TRACING: CPT | Performed by: EMERGENCY MEDICINE

## 2024-02-21 PROCEDURE — 71045 X-RAY EXAM CHEST 1 VIEW: CPT

## 2024-02-21 RX ORDER — FLUTICASONE PROPIONATE 50 MCG
1 SPRAY, SUSPENSION (ML) NASAL DAILY
COMMUNITY

## 2024-02-21 RX ORDER — ALBUTEROL SULFATE 90 UG/1
2 AEROSOL, METERED RESPIRATORY (INHALATION) EVERY 6 HOURS PRN
Qty: 18 G | Refills: 3 | Status: SHIPPED | OUTPATIENT
Start: 2024-02-21

## 2024-02-21 ASSESSMENT — LIFESTYLE VARIABLES
HOW MANY STANDARD DRINKS CONTAINING ALCOHOL DO YOU HAVE ON A TYPICAL DAY: PATIENT DOES NOT DRINK
HOW OFTEN DO YOU HAVE A DRINK CONTAINING ALCOHOL: NEVER

## 2024-02-21 ASSESSMENT — PAIN - FUNCTIONAL ASSESSMENT
PAIN_FUNCTIONAL_ASSESSMENT: NONE - DENIES PAIN

## 2024-02-21 NOTE — ED NOTES
Pt alert and without s/s distress or discomfort, resps even and unlab. Denies fevers, CP, SOB or further c/o's.

## 2024-02-21 NOTE — DISCHARGE INSTRUCTIONS
Continue with your present regimen of medications i.e. the doxycycline and the steroids till gone  Follow-up with your primary care physician for possibly referral to a pulmonologist  Use Proventil inhaler as a rescue inhaler 2 puffs every 4-6 hours if needed for shortness of breath

## 2024-04-24 ENCOUNTER — TELEPHONE (OUTPATIENT)
Dept: PULMONOLOGY | Age: 84
End: 2024-04-24

## 2024-04-24 NOTE — TELEPHONE ENCOUNTER
Patient cancelled appointment on 5/1/24 with Dr. Khan for Npt r/b Jennifer, SOB on exertion .    Reason: Pt gave no reason. Pt doesn't wish to r/s.    Patient did not reschedule appointment.    Appointment rescheduled for n/a.     Last OV n/a (npt)

## (undated) DEVICE — STERILE POLYISOPRENE POWDER-FREE SURGICAL GLOVES: Brand: PROTEXIS

## (undated) DEVICE — GAUZE,SPONGE,4"X4",16PLY,STRL,LF,10/TRAY: Brand: MEDLINE

## (undated) DEVICE — ALCOHOL RUBBING 16OZ 70% ISO

## (undated) DEVICE — UNIVERSAL BLOCK TRAY: Brand: MEDLINE INDUSTRIES, INC.

## (undated) DEVICE — TOWEL OR BLUEE 16X26IN ST 8 PACK ORB08 16X26ORTWL

## (undated) DEVICE — CHLORAPREP 26ML ORANGE